# Patient Record
Sex: MALE | Race: WHITE | NOT HISPANIC OR LATINO | URBAN - METROPOLITAN AREA
[De-identification: names, ages, dates, MRNs, and addresses within clinical notes are randomized per-mention and may not be internally consistent; named-entity substitution may affect disease eponyms.]

---

## 2019-09-17 ENCOUNTER — APPOINTMENT (RX ONLY)
Dept: URBAN - METROPOLITAN AREA CLINIC 174 | Facility: CLINIC | Age: 69
Setting detail: DERMATOLOGY
End: 2019-09-17

## 2019-09-17 DIAGNOSIS — L81.4 OTHER MELANIN HYPERPIGMENTATION: ICD-10-CM

## 2019-09-17 DIAGNOSIS — Z71.89 OTHER SPECIFIED COUNSELING: ICD-10-CM

## 2019-09-17 DIAGNOSIS — D22 MELANOCYTIC NEVI: ICD-10-CM

## 2019-09-17 DIAGNOSIS — D18.0 HEMANGIOMA: ICD-10-CM

## 2019-09-17 DIAGNOSIS — L82.1 OTHER SEBORRHEIC KERATOSIS: ICD-10-CM

## 2019-09-17 DIAGNOSIS — L57.0 ACTINIC KERATOSIS: ICD-10-CM

## 2019-09-17 PROBLEM — D18.01 HEMANGIOMA OF SKIN AND SUBCUTANEOUS TISSUE: Status: ACTIVE | Noted: 2019-09-17

## 2019-09-17 PROBLEM — D22.5 MELANOCYTIC NEVI OF TRUNK: Status: ACTIVE | Noted: 2019-09-17

## 2019-09-17 PROBLEM — D48.5 NEOPLASM OF UNCERTAIN BEHAVIOR OF SKIN: Status: ACTIVE | Noted: 2019-09-17

## 2019-09-17 PROCEDURE — 17003 DESTRUCT PREMALG LES 2-14: CPT

## 2019-09-17 PROCEDURE — 17000 DESTRUCT PREMALG LESION: CPT | Mod: 59

## 2019-09-17 PROCEDURE — ? LIQUID NITROGEN

## 2019-09-17 PROCEDURE — 99214 OFFICE O/P EST MOD 30 MIN: CPT | Mod: 25

## 2019-09-17 PROCEDURE — 11102 TANGNTL BX SKIN SINGLE LES: CPT

## 2019-09-17 PROCEDURE — ? INVENTORY

## 2019-09-17 PROCEDURE — ? BIOPSY BY SHAVE METHOD

## 2019-09-17 PROCEDURE — ? COUNSELING

## 2019-09-17 ASSESSMENT — LOCATION ZONE DERM
LOCATION ZONE: EAR
LOCATION ZONE: NECK
LOCATION ZONE: FACE
LOCATION ZONE: TRUNK
LOCATION ZONE: LEG

## 2019-09-17 ASSESSMENT — LOCATION SIMPLE DESCRIPTION DERM
LOCATION SIMPLE: LEFT FOREHEAD
LOCATION SIMPLE: RIGHT LOWER BACK
LOCATION SIMPLE: LEFT CHEEK
LOCATION SIMPLE: LEFT POSTERIOR THIGH
LOCATION SIMPLE: RIGHT EAR
LOCATION SIMPLE: RIGHT ANTERIOR NECK
LOCATION SIMPLE: RIGHT UPPER BACK
LOCATION SIMPLE: ABDOMEN
LOCATION SIMPLE: RIGHT FOREHEAD
LOCATION SIMPLE: CHEST

## 2019-09-17 ASSESSMENT — LOCATION DETAILED DESCRIPTION DERM
LOCATION DETAILED: RIGHT MID-UPPER BACK
LOCATION DETAILED: LEFT FOREHEAD
LOCATION DETAILED: RIGHT TRIANGULAR FOSSA
LOCATION DETAILED: EPIGASTRIC SKIN
LOCATION DETAILED: RIGHT MEDIAL SUPERIOR CHEST
LOCATION DETAILED: LEFT SUPERIOR PREAURICULAR CHEEK
LOCATION DETAILED: STERNAL NOTCH
LOCATION DETAILED: LEFT SUPERIOR FOREHEAD
LOCATION DETAILED: LEFT MID PREAURICULAR CHEEK
LOCATION DETAILED: LEFT MEDIAL SUPERIOR CHEST
LOCATION DETAILED: RIGHT LATERAL FOREHEAD
LOCATION DETAILED: LEFT DISTAL POSTERIOR THIGH
LOCATION DETAILED: RIGHT SUPERIOR LATERAL MIDBACK
LOCATION DETAILED: RIGHT CLAVICULAR NECK

## 2019-09-17 NOTE — PROCEDURE: LIQUID NITROGEN
Post-Care Instructions: I reviewed with the patient in detail post-care instructions. Patient is to wear sunprotection, and avoid picking at any of the treated lesions. Pt may apply Vaseline to crusted or scabbing areas.
Render Post-Care Instructions In Note?: yes
Detail Level: Simple
Consent: The patient's consent was obtained including but not limited to risks of crusting, scabbing, blistering, scarring, darker or lighter pigmentary change, recurrence, incomplete removal and infection.
Duration Of Freeze Thaw-Cycle (Seconds): 5
Render Note In Bullet Format When Appropriate: No
Number Of Freeze-Thaw Cycles: 1 freeze-thaw cycle

## 2019-09-17 NOTE — PROCEDURE: MIPS QUALITY
Quality 402: Tobacco Use And Help With Quitting Among Adolescents: Patient screened for tobacco and never smoked
Quality 110: Preventive Care And Screening: Influenza Immunization: Influenza Immunization previously received during influenza season
Quality 431: Preventive Care And Screening: Unhealthy Alcohol Use - Screening: Patient screened for unhealthy alcohol use using a single question and scores less than 2 times per year
Detail Level: Detailed
Quality 111:Pneumonia Vaccination Status For Older Adults: Pneumococcal Vaccination Previously Received
Quality 226: Preventive Care And Screening: Tobacco Use: Screening And Cessation Intervention: Patient screened for tobacco use and is an ex/non-smoker
Quality 130: Documentation Of Current Medications In The Medical Record: Current Medications Documented

## 2019-09-17 NOTE — PROCEDURE: BIOPSY BY SHAVE METHOD
Render In Bullet Format When Appropriate: No
Hemostasis: Aluminum Chloride
Biopsy Method: Personna blade
Silver Nitrate Text: The wound bed was treated with silver nitrate after the biopsy was performed.
Was A Bandage Applied: Yes
Dressing: bandage
Detail Level: Detailed
Lab: 6
Billing Type: Third-Party Bill
Curettage Text: The wound bed was treated with curettage after the biopsy was performed.
Electrodesiccation Text: The wound bed was treated with electrodesiccation after the biopsy was performed.
Notification Instructions: Patient will be notified of biopsy results. However, patient instructed to call the office if not contacted within 2 weeks.
Depth Of Biopsy: dermis
Wound Care: Petrolatum
Size Of Lesion In Cm: 0
Anesthesia Type: 1% lidocaine with epinephrine
Cryotherapy Text: The wound bed was treated with cryotherapy after the biopsy was performed.
Post-Care Instructions: I reviewed with the patient in detail post-care instructions. Patient is to keep the biopsy site covered overnight, and then apply Vaseline twice daily until healed. Patient to wash with gentle soap and water twice a day.
Lab Facility: 3
Anesthesia Volume In Cc (Will Not Render If 0): 0.5
Type Of Destruction Used: Curettage
Consent: Written consent was obtained and risks were reviewed including but not limited to scarring, infection, bleeding, scabbing, incomplete removal, nerve damage and allergy to anesthesia.
Electrodesiccation And Curettage Text: The wound bed was treated with electrodesiccation and curettage after the biopsy was performed.
Biopsy Type: H and E

## 2019-11-05 ENCOUNTER — APPOINTMENT (RX ONLY)
Dept: URBAN - METROPOLITAN AREA CLINIC 174 | Facility: CLINIC | Age: 69
Setting detail: DERMATOLOGY
End: 2019-11-05

## 2019-11-05 DIAGNOSIS — L85.3 XEROSIS CUTIS: ICD-10-CM

## 2019-11-05 DIAGNOSIS — Z87.2 PERSONAL HISTORY OF DISEASES OF THE SKIN AND SUBCUTANEOUS TISSUE: ICD-10-CM

## 2019-11-05 DIAGNOSIS — L57.0 ACTINIC KERATOSIS: ICD-10-CM

## 2019-11-05 PROCEDURE — ? LIQUID NITROGEN

## 2019-11-05 PROCEDURE — 17000 DESTRUCT PREMALG LESION: CPT

## 2019-11-05 PROCEDURE — 99212 OFFICE O/P EST SF 10 MIN: CPT | Mod: 25

## 2019-11-05 PROCEDURE — ? PRESCRIPTION SAMPLES PROVIDED

## 2019-11-05 PROCEDURE — ? COUNSELING

## 2019-11-05 PROCEDURE — ? ADDITIONAL NOTES

## 2019-11-05 ASSESSMENT — LOCATION DETAILED DESCRIPTION DERM
LOCATION DETAILED: RIGHT CLAVICULAR NECK
LOCATION DETAILED: LEFT FOREHEAD
LOCATION DETAILED: RIGHT CENTRAL MALAR CHEEK
LOCATION DETAILED: LEFT CENTRAL MALAR CHEEK
LOCATION DETAILED: LEFT DISTAL POSTERIOR THIGH
LOCATION DETAILED: RIGHT FOREHEAD
LOCATION DETAILED: LEFT CLAVICULAR NECK
LOCATION DETAILED: LEFT INFERIOR ANTERIOR NECK

## 2019-11-05 ASSESSMENT — LOCATION SIMPLE DESCRIPTION DERM
LOCATION SIMPLE: LEFT CHEEK
LOCATION SIMPLE: LEFT ANTERIOR NECK
LOCATION SIMPLE: LEFT POSTERIOR THIGH
LOCATION SIMPLE: LEFT FOREHEAD
LOCATION SIMPLE: RIGHT CHEEK
LOCATION SIMPLE: RIGHT ANTERIOR NECK
LOCATION SIMPLE: RIGHT FOREHEAD

## 2019-11-05 ASSESSMENT — LOCATION ZONE DERM
LOCATION ZONE: FACE
LOCATION ZONE: NECK
LOCATION ZONE: LEG

## 2019-11-05 NOTE — PROCEDURE: ADDITIONAL NOTES
Additional Notes: Eucerin, Aveeno,  Serave CREAMS. Moisturizer after shower while slightly damp
Detail Level: Simple

## 2019-11-05 NOTE — PROCEDURE: LIQUID NITROGEN
Detail Level: Simple
Post-Care Instructions: I reviewed with the patient in detail post-care instructions. Patient is to wear sunprotection, and avoid picking at any of the treated lesions. Pt may apply Vaseline to crusted or scabbing areas.
Duration Of Freeze Thaw-Cycle (Seconds): 5
Render Note In Bullet Format When Appropriate: No
Render Post-Care Instructions In Note?: yes
Number Of Freeze-Thaw Cycles: 1 freeze-thaw cycle
Consent: The patient's consent was obtained including but not limited to risks of crusting, scabbing, blistering, scarring, darker or lighter pigmentary change, recurrence, incomplete removal and infection.

## 2019-11-05 NOTE — PROCEDURE: PRESCRIPTION SAMPLES PROVIDED
Samples Given: Eucerin and Cerave cream. Recommend creams over lotions to increase hydration and moisture.
Detail Level: Zone

## 2020-09-09 ENCOUNTER — APPOINTMENT (RX ONLY)
Dept: URBAN - METROPOLITAN AREA CLINIC 377 | Facility: CLINIC | Age: 70
Setting detail: DERMATOLOGY
End: 2020-09-09

## 2020-09-09 DIAGNOSIS — L57.0 ACTINIC KERATOSIS: ICD-10-CM

## 2020-09-09 DIAGNOSIS — D22 MELANOCYTIC NEVI: ICD-10-CM

## 2020-09-09 DIAGNOSIS — L82.1 OTHER SEBORRHEIC KERATOSIS: ICD-10-CM

## 2020-09-09 DIAGNOSIS — L81.4 OTHER MELANIN HYPERPIGMENTATION: ICD-10-CM

## 2020-09-09 DIAGNOSIS — D18.0 HEMANGIOMA: ICD-10-CM

## 2020-09-09 DIAGNOSIS — Z71.89 OTHER SPECIFIED COUNSELING: ICD-10-CM

## 2020-09-09 PROBLEM — D22.5 MELANOCYTIC NEVI OF TRUNK: Status: ACTIVE | Noted: 2020-09-09

## 2020-09-09 PROBLEM — D18.01 HEMANGIOMA OF SKIN AND SUBCUTANEOUS TISSUE: Status: ACTIVE | Noted: 2020-09-09

## 2020-09-09 PROBLEM — D48.5 NEOPLASM OF UNCERTAIN BEHAVIOR OF SKIN: Status: ACTIVE | Noted: 2020-09-09

## 2020-09-09 PROCEDURE — 17000 DESTRUCT PREMALG LESION: CPT | Mod: 59

## 2020-09-09 PROCEDURE — ? BIOPSY BY SHAVE METHOD

## 2020-09-09 PROCEDURE — 99214 OFFICE O/P EST MOD 30 MIN: CPT | Mod: 25

## 2020-09-09 PROCEDURE — 17003 DESTRUCT PREMALG LES 2-14: CPT

## 2020-09-09 PROCEDURE — ? LIQUID NITROGEN

## 2020-09-09 PROCEDURE — 11102 TANGNTL BX SKIN SINGLE LES: CPT

## 2020-09-09 PROCEDURE — ? ADDITIONAL NOTES

## 2020-09-09 PROCEDURE — ? COUNSELING

## 2020-09-09 PROCEDURE — ? FULL BODY SKIN EXAM

## 2020-09-09 ASSESSMENT — LOCATION SIMPLE DESCRIPTION DERM
LOCATION SIMPLE: LEFT EYEBROW
LOCATION SIMPLE: ABDOMEN
LOCATION SIMPLE: LEFT FOREARM
LOCATION SIMPLE: RIGHT LOWER BACK
LOCATION SIMPLE: CHEST
LOCATION SIMPLE: LEFT HAND
LOCATION SIMPLE: RIGHT UPPER BACK
LOCATION SIMPLE: LEFT CHEEK
LOCATION SIMPLE: LEFT FOREHEAD

## 2020-09-09 ASSESSMENT — LOCATION DETAILED DESCRIPTION DERM
LOCATION DETAILED: RIGHT MEDIAL SUPERIOR CHEST
LOCATION DETAILED: LEFT DORSAL INDEX METACARPOPHALANGEAL JOINT
LOCATION DETAILED: LEFT FOREHEAD
LOCATION DETAILED: LEFT INFERIOR CENTRAL MALAR CHEEK
LOCATION DETAILED: RIGHT SUPERIOR UPPER BACK
LOCATION DETAILED: LEFT LATERAL EYEBROW
LOCATION DETAILED: RIGHT MID-UPPER BACK
LOCATION DETAILED: RIGHT SUPERIOR LATERAL MIDBACK
LOCATION DETAILED: LEFT PROXIMAL DORSAL FOREARM
LOCATION DETAILED: EPIGASTRIC SKIN

## 2020-09-09 ASSESSMENT — LOCATION ZONE DERM
LOCATION ZONE: ARM
LOCATION ZONE: TRUNK
LOCATION ZONE: HAND
LOCATION ZONE: FACE

## 2020-09-09 NOTE — HPI: EVALUATION OF SKIN LESION(S)
What Type Of Note Output Would You Prefer (Optional)?: Bullet Format
Hpi Title: Evaluation of Skin Lesions
How Severe Are Your Spot(S)?: mild
Have Your Spot(S) Been Treated In The Past?: has not been treated
Additional History: FBSE
Hpi Title: Evaluation of a Skin Lesion
Have Your Spot(S) Been Treated In The Past?: has been treated
When Was It Treated?: 05/15/2020

## 2020-09-09 NOTE — PROCEDURE: LIQUID NITROGEN
Post-Care Instructions: I reviewed with the patient in detail post-care instructions. Patient is to wear sunprotection, and avoid picking at any of the treated lesions. Pt may apply Vaseline to crusted or scabbing areas.
Render Note In Bullet Format When Appropriate: No
Duration Of Freeze Thaw-Cycle (Seconds): 5
Detail Level: Simple
Consent: The patient's consent was obtained including but not limited to risks of crusting, scabbing, blistering, scarring, darker or lighter pigmentary change, recurrence, incomplete removal and infection.
Render Post-Care Instructions In Note?: yes
Number Of Freeze-Thaw Cycles: 1 freeze-thaw cycle

## 2021-06-21 ENCOUNTER — APPOINTMENT (RX ONLY)
Dept: URBAN - METROPOLITAN AREA CLINIC 174 | Facility: CLINIC | Age: 71
Setting detail: DERMATOLOGY
End: 2021-06-21

## 2021-06-21 DIAGNOSIS — L82.0 INFLAMED SEBORRHEIC KERATOSIS: ICD-10-CM

## 2021-06-21 DIAGNOSIS — L30.9 DERMATITIS, UNSPECIFIED: ICD-10-CM | Status: INADEQUATELY CONTROLLED

## 2021-06-21 PROBLEM — D48.5 NEOPLASM OF UNCERTAIN BEHAVIOR OF SKIN: Status: ACTIVE | Noted: 2021-06-21

## 2021-06-21 PROCEDURE — ? BIOPSY BY SHAVE METHOD

## 2021-06-21 PROCEDURE — ? COUNSELING

## 2021-06-21 PROCEDURE — 11102 TANGNTL BX SKIN SINGLE LES: CPT

## 2021-06-21 PROCEDURE — 99214 OFFICE O/P EST MOD 30 MIN: CPT | Mod: 25

## 2021-06-21 PROCEDURE — ? PRESCRIPTION

## 2021-06-21 PROCEDURE — ? TREATMENT REGIMEN

## 2021-06-21 PROCEDURE — ? MEDICATION COUNSELING

## 2021-06-21 RX ORDER — TRIAMCINOLONE ACETONIDE 1 MG/G
OINTMENT TOPICAL BID
Qty: 1 | Refills: 2 | Status: ERX

## 2021-06-21 ASSESSMENT — LOCATION SIMPLE DESCRIPTION DERM
LOCATION SIMPLE: RIGHT ANTERIOR NECK
LOCATION SIMPLE: RIGHT PRETIBIAL REGION

## 2021-06-21 ASSESSMENT — LOCATION DETAILED DESCRIPTION DERM
LOCATION DETAILED: RIGHT CLAVICULAR NECK
LOCATION DETAILED: RIGHT DISTAL PRETIBIAL REGION

## 2021-06-21 ASSESSMENT — LOCATION ZONE DERM
LOCATION ZONE: LEG
LOCATION ZONE: NECK

## 2021-06-21 NOTE — PROCEDURE: MEDICATION COUNSELING
Xelraadz Pregnancy And Lactation Text: This medication is Pregnancy Category D and is not considered safe during pregnancy.  The risk during breast feeding is also uncertain.

## 2021-10-18 ENCOUNTER — APPOINTMENT (RX ONLY)
Dept: URBAN - METROPOLITAN AREA CLINIC 174 | Facility: CLINIC | Age: 71
Setting detail: DERMATOLOGY
End: 2021-10-18

## 2021-10-18 DIAGNOSIS — D22 MELANOCYTIC NEVI: ICD-10-CM | Status: STABLE

## 2021-10-18 DIAGNOSIS — L57.0 ACTINIC KERATOSIS: ICD-10-CM

## 2021-10-18 DIAGNOSIS — D18.0 HEMANGIOMA: ICD-10-CM | Status: STABLE

## 2021-10-18 DIAGNOSIS — L81.4 OTHER MELANIN HYPERPIGMENTATION: ICD-10-CM | Status: STABLE

## 2021-10-18 DIAGNOSIS — L82.1 OTHER SEBORRHEIC KERATOSIS: ICD-10-CM | Status: STABLE

## 2021-10-18 DIAGNOSIS — Z71.89 OTHER SPECIFIED COUNSELING: ICD-10-CM

## 2021-10-18 PROBLEM — D22.5 MELANOCYTIC NEVI OF TRUNK: Status: ACTIVE | Noted: 2021-10-18

## 2021-10-18 PROBLEM — D18.01 HEMANGIOMA OF SKIN AND SUBCUTANEOUS TISSUE: Status: ACTIVE | Noted: 2021-10-18

## 2021-10-18 PROCEDURE — ? FULL BODY SKIN EXAM

## 2021-10-18 PROCEDURE — 99213 OFFICE O/P EST LOW 20 MIN: CPT | Mod: 25

## 2021-10-18 PROCEDURE — 17003 DESTRUCT PREMALG LES 2-14: CPT

## 2021-10-18 PROCEDURE — ? LIQUID NITROGEN

## 2021-10-18 PROCEDURE — ? ADDITIONAL NOTES

## 2021-10-18 PROCEDURE — ? COUNSELING

## 2021-10-18 PROCEDURE — 17000 DESTRUCT PREMALG LESION: CPT

## 2021-10-18 PROCEDURE — ? SUNSCREEN TREATMENT REGIMEN

## 2021-10-18 ASSESSMENT — LOCATION SIMPLE DESCRIPTION DERM
LOCATION SIMPLE: UPPER BACK
LOCATION SIMPLE: RIGHT ANTERIOR NECK
LOCATION SIMPLE: CHEST
LOCATION SIMPLE: LEFT ANTERIOR NECK
LOCATION SIMPLE: LEFT UPPER BACK
LOCATION SIMPLE: LEFT FOREARM
LOCATION SIMPLE: LEFT FOREHEAD

## 2021-10-18 ASSESSMENT — LOCATION DETAILED DESCRIPTION DERM
LOCATION DETAILED: LEFT SUPERIOR MEDIAL UPPER BACK
LOCATION DETAILED: SUPERIOR THORACIC SPINE
LOCATION DETAILED: RIGHT CLAVICULAR NECK
LOCATION DETAILED: LEFT SUPERIOR UPPER BACK
LOCATION DETAILED: LEFT MEDIAL UPPER BACK
LOCATION DETAILED: LEFT INFERIOR LATERAL FOREHEAD
LOCATION DETAILED: LEFT DISTAL DORSAL FOREARM
LOCATION DETAILED: STERNUM
LOCATION DETAILED: LEFT INFERIOR ANTERIOR NECK
LOCATION DETAILED: RIGHT MEDIAL SUPERIOR CHEST

## 2021-10-18 ASSESSMENT — LOCATION ZONE DERM
LOCATION ZONE: FACE
LOCATION ZONE: ARM
LOCATION ZONE: TRUNK
LOCATION ZONE: NECK

## 2021-10-18 NOTE — PROCEDURE: LIQUID NITROGEN
Duration Of Freeze Thaw-Cycle (Seconds): 6
Render Note In Bullet Format When Appropriate: No
Show Aperture Variable?: Yes
Detail Level: Detailed
Number Of Freeze-Thaw Cycles: 1 freeze-thaw cycle
Consent: The patient's consent was obtained including but not limited to risks of crusting, scabbing, blistering, scarring, darker or lighter pigmentary change, recurrence, incomplete removal and infection.
Post-Care Instructions: I reviewed with the patient in detail post-care instructions. Patient is to wear sunprotection, and avoid picking at any of the treated lesions. Pt may apply Vaseline to crusted or scabbing areas.

## 2022-04-25 NOTE — PROCEDURE: MEDICATION COUNSELING
Provider Procedure Text (A): After obtaining clear surgical margins the defect was repaired by another provider. Libtayo Pregnancy And Lactation Text: This medication is contraindicated in pregnancy and when breast feeding.

## 2022-08-29 ENCOUNTER — APPOINTMENT (RX ONLY)
Dept: URBAN - METROPOLITAN AREA CLINIC 174 | Facility: CLINIC | Age: 72
Setting detail: DERMATOLOGY
End: 2022-08-29

## 2022-08-29 DIAGNOSIS — L57.0 ACTINIC KERATOSIS: ICD-10-CM

## 2022-08-29 DIAGNOSIS — L85.3 XEROSIS CUTIS: ICD-10-CM | Status: WELL CONTROLLED

## 2022-08-29 PROCEDURE — 99213 OFFICE O/P EST LOW 20 MIN: CPT | Mod: 25

## 2022-08-29 PROCEDURE — 17003 DESTRUCT PREMALG LES 2-14: CPT

## 2022-08-29 PROCEDURE — 17000 DESTRUCT PREMALG LESION: CPT

## 2022-08-29 PROCEDURE — ? LIQUID NITROGEN

## 2022-08-29 PROCEDURE — ? PRESCRIPTION

## 2022-08-29 PROCEDURE — ? ADDITIONAL NOTES

## 2022-08-29 PROCEDURE — ? COUNSELING

## 2022-08-29 RX ORDER — AMMONIUM LACTATE 12 G/100G
LOTION TOPICAL BID
Qty: 400 | Refills: 5 | Status: ERX | COMMUNITY
Start: 2022-08-29

## 2022-08-29 RX ADMIN — AMMONIUM LACTATE: 12 LOTION TOPICAL at 00:00

## 2022-08-29 ASSESSMENT — LOCATION SIMPLE DESCRIPTION DERM
LOCATION SIMPLE: RIGHT THIGH
LOCATION SIMPLE: RIGHT UPPER ARM
LOCATION SIMPLE: RIGHT FOREHEAD
LOCATION SIMPLE: LEFT UPPER ARM
LOCATION SIMPLE: LEFT THIGH

## 2022-08-29 ASSESSMENT — LOCATION DETAILED DESCRIPTION DERM
LOCATION DETAILED: RIGHT LATERAL FOREHEAD
LOCATION DETAILED: RIGHT SUPERIOR FOREHEAD
LOCATION DETAILED: LEFT ANTERIOR DISTAL UPPER ARM
LOCATION DETAILED: RIGHT ANTERIOR DISTAL UPPER ARM
LOCATION DETAILED: RIGHT ANTERIOR PROXIMAL THIGH
LOCATION DETAILED: LEFT ANTERIOR PROXIMAL THIGH

## 2022-08-29 ASSESSMENT — LOCATION ZONE DERM
LOCATION ZONE: LEG
LOCATION ZONE: FACE
LOCATION ZONE: ARM

## 2022-08-29 NOTE — PROCEDURE: LIQUID NITROGEN
Render Note In Bullet Format When Appropriate: No
Post-Care Instructions: I reviewed with the patient in detail post-care instructions. Patient is to wear sunprotection, and avoid picking at any of the treated lesions. Pt may apply Vaseline to crusted or scabbing areas.
Duration Of Freeze Thaw-Cycle (Seconds): 6
Show Applicator Variable?: Yes
Number Of Freeze-Thaw Cycles: 1 freeze-thaw cycle
Consent: The patient's consent was obtained including but not limited to risks of crusting, scabbing, blistering, scarring, darker or lighter pigmentary change, recurrence, incomplete removal and infection.
Detail Level: Detailed

## 2022-08-29 NOTE — PROCEDURE: ADDITIONAL NOTES
Detail Level: Detailed
Additional Notes: Scalp with scattered red macs — mild and fine scale
Render Risk Assessment In Note?: no

## 2023-09-27 ENCOUNTER — OFFICE (OUTPATIENT)
Dept: URBAN - METROPOLITAN AREA PATHOLOGY 1 | Facility: PATHOLOGY | Age: 73
End: 2023-09-27
Payer: COMMERCIAL

## 2023-09-27 ENCOUNTER — AMBULATORY SURGICAL CENTER (OUTPATIENT)
Dept: URBAN - METROPOLITAN AREA SURGERY 5 | Facility: SURGERY | Age: 73
End: 2023-09-27
Payer: COMMERCIAL

## 2023-09-27 ENCOUNTER — AMBULATORY SURGICAL CENTER (OUTPATIENT)
Dept: URBAN - METROPOLITAN AREA SURGERY 5 | Facility: SURGERY | Age: 73
End: 2023-09-27

## 2023-09-27 VITALS
HEIGHT: 73 IN | DIASTOLIC BLOOD PRESSURE: 63 MMHG | TEMPERATURE: 97.5 F | SYSTOLIC BLOOD PRESSURE: 122 MMHG | SYSTOLIC BLOOD PRESSURE: 100 MMHG | SYSTOLIC BLOOD PRESSURE: 101 MMHG | TEMPERATURE: 97.5 F | OXYGEN SATURATION: 95 % | HEART RATE: 70 BPM | SYSTOLIC BLOOD PRESSURE: 122 MMHG | DIASTOLIC BLOOD PRESSURE: 68 MMHG | RESPIRATION RATE: 20 BRPM | RESPIRATION RATE: 18 BRPM | RESPIRATION RATE: 21 BRPM | SYSTOLIC BLOOD PRESSURE: 110 MMHG | RESPIRATION RATE: 18 BRPM | HEART RATE: 67 BPM | HEART RATE: 78 BPM | DIASTOLIC BLOOD PRESSURE: 63 MMHG | WEIGHT: 225 LBS | OXYGEN SATURATION: 95 % | SYSTOLIC BLOOD PRESSURE: 114 MMHG | HEART RATE: 77 BPM | OXYGEN SATURATION: 98 % | OXYGEN SATURATION: 96 % | SYSTOLIC BLOOD PRESSURE: 120 MMHG | OXYGEN SATURATION: 97 % | RESPIRATION RATE: 17 BRPM | SYSTOLIC BLOOD PRESSURE: 114 MMHG | SYSTOLIC BLOOD PRESSURE: 117 MMHG | SYSTOLIC BLOOD PRESSURE: 101 MMHG | DIASTOLIC BLOOD PRESSURE: 73 MMHG | DIASTOLIC BLOOD PRESSURE: 73 MMHG | HEIGHT: 73 IN | RESPIRATION RATE: 13 BRPM | DIASTOLIC BLOOD PRESSURE: 76 MMHG | RESPIRATION RATE: 17 BRPM | SYSTOLIC BLOOD PRESSURE: 117 MMHG | SYSTOLIC BLOOD PRESSURE: 110 MMHG | HEART RATE: 75 BPM | HEART RATE: 77 BPM | DIASTOLIC BLOOD PRESSURE: 68 MMHG | DIASTOLIC BLOOD PRESSURE: 67 MMHG | RESPIRATION RATE: 13 BRPM | DIASTOLIC BLOOD PRESSURE: 65 MMHG | DIASTOLIC BLOOD PRESSURE: 79 MMHG | SYSTOLIC BLOOD PRESSURE: 100 MMHG | HEART RATE: 76 BPM | DIASTOLIC BLOOD PRESSURE: 65 MMHG | OXYGEN SATURATION: 98 % | HEART RATE: 74 BPM | OXYGEN SATURATION: 96 % | DIASTOLIC BLOOD PRESSURE: 67 MMHG | DIASTOLIC BLOOD PRESSURE: 79 MMHG | HEART RATE: 75 BPM | SYSTOLIC BLOOD PRESSURE: 133 MMHG | HEART RATE: 70 BPM | HEART RATE: 74 BPM | SYSTOLIC BLOOD PRESSURE: 99 MMHG | SYSTOLIC BLOOD PRESSURE: 115 MMHG | OXYGEN SATURATION: 97 % | RESPIRATION RATE: 21 BRPM | HEART RATE: 76 BPM | SYSTOLIC BLOOD PRESSURE: 99 MMHG | DIASTOLIC BLOOD PRESSURE: 76 MMHG | RESPIRATION RATE: 20 BRPM | SYSTOLIC BLOOD PRESSURE: 120 MMHG | HEART RATE: 67 BPM | HEART RATE: 78 BPM | WEIGHT: 225 LBS | SYSTOLIC BLOOD PRESSURE: 115 MMHG | SYSTOLIC BLOOD PRESSURE: 133 MMHG

## 2023-09-27 DIAGNOSIS — K64.0 FIRST DEGREE HEMORRHOIDS: ICD-10-CM

## 2023-09-27 DIAGNOSIS — K62.1 RECTAL POLYP: ICD-10-CM

## 2023-09-27 DIAGNOSIS — Z86.010 PERSONAL HISTORY OF COLONIC POLYPS: ICD-10-CM

## 2023-09-27 DIAGNOSIS — K63.5 POLYP OF COLON: ICD-10-CM

## 2023-09-27 DIAGNOSIS — Z09 ENCOUNTER FOR FOLLOW-UP EXAMINATION AFTER COMPLETED TREATMEN: ICD-10-CM

## 2023-09-27 DIAGNOSIS — K57.30 DIVERTICULOSIS OF LARGE INTESTINE WITHOUT PERFORATION OR ABS: ICD-10-CM

## 2023-09-27 PROCEDURE — 45385 COLONOSCOPY W/LESION REMOVAL: CPT | Mod: PT | Performed by: INTERNAL MEDICINE

## 2023-09-27 PROCEDURE — 88305 TISSUE EXAM BY PATHOLOGIST: CPT | Performed by: PATHOLOGY

## 2023-10-02 LAB
PDF: PDF REPORT: (no result)
PDF: PDF REPORT: (no result)

## 2024-05-10 ENCOUNTER — APPOINTMENT (OUTPATIENT)
Dept: GENERAL RADIOLOGY | Facility: HOSPITAL | Age: 74
End: 2024-05-10
Payer: MEDICARE

## 2024-05-10 ENCOUNTER — APPOINTMENT (OUTPATIENT)
Dept: CT IMAGING | Facility: HOSPITAL | Age: 74
End: 2024-05-10
Payer: MEDICARE

## 2024-05-10 ENCOUNTER — HOSPITAL ENCOUNTER (OUTPATIENT)
Facility: HOSPITAL | Age: 74
Setting detail: OBSERVATION
Discharge: HOME | End: 2024-05-12
Attending: EMERGENCY MEDICINE | Admitting: EMERGENCY MEDICINE
Payer: MEDICARE

## 2024-05-10 DIAGNOSIS — R10.13 EPIGASTRIC PAIN: Primary | ICD-10-CM

## 2024-05-10 DIAGNOSIS — I45.10 RIGHT BUNDLE BRANCH BLOCK: ICD-10-CM

## 2024-05-10 DIAGNOSIS — N28.9 RENAL INSUFFICIENCY: ICD-10-CM

## 2024-05-10 LAB
ALBUMIN SERPL-MCNC: 4.1 G/DL (ref 3.5–5.2)
ALBUMIN/GLOB SERPL: 1.6 G/DL
ALP SERPL-CCNC: 83 U/L (ref 39–117)
ALT SERPL W P-5'-P-CCNC: 22 U/L (ref 1–41)
ANION GAP SERPL CALCULATED.3IONS-SCNC: 15 MMOL/L (ref 5–15)
AST SERPL-CCNC: 27 U/L (ref 1–40)
BASOPHILS # BLD AUTO: 0.12 10*3/MM3 (ref 0–0.2)
BASOPHILS NFR BLD AUTO: 1.4 % (ref 0–1.5)
BILIRUB SERPL-MCNC: 1 MG/DL (ref 0–1.2)
BILIRUB UR QL STRIP: NEGATIVE
BUN SERPL-MCNC: 24 MG/DL (ref 8–23)
BUN/CREAT SERPL: 15.6 (ref 7–25)
CALCIUM SPEC-SCNC: 8.6 MG/DL (ref 8.6–10.5)
CHLORIDE SERPL-SCNC: 104 MMOL/L (ref 98–107)
CLARITY UR: CLEAR
CO2 SERPL-SCNC: 22 MMOL/L (ref 22–29)
COLOR UR: YELLOW
CREAT SERPL-MCNC: 1.54 MG/DL (ref 0.76–1.27)
D DIMER PPP FEU-MCNC: <0.27 MCGFEU/ML (ref 0–0.74)
DEPRECATED RDW RBC AUTO: 41.9 FL (ref 37–54)
EGFRCR SERPLBLD CKD-EPI 2021: 47 ML/MIN/1.73
EOSINOPHIL # BLD AUTO: 0.26 10*3/MM3 (ref 0–0.4)
EOSINOPHIL NFR BLD AUTO: 3.1 % (ref 0.3–6.2)
ERYTHROCYTE [DISTWIDTH] IN BLOOD BY AUTOMATED COUNT: 13.1 % (ref 12.3–15.4)
GEN 5 2HR TROPONIN T REFLEX: 11 NG/L
GLOBULIN UR ELPH-MCNC: 2.5 GM/DL
GLUCOSE SERPL-MCNC: 102 MG/DL (ref 65–99)
GLUCOSE UR STRIP-MCNC: NEGATIVE MG/DL
HCT VFR BLD AUTO: 48.6 % (ref 37.5–51)
HGB BLD-MCNC: 16 G/DL (ref 13–17.7)
HGB UR QL STRIP.AUTO: NEGATIVE
HOLD SPECIMEN: NORMAL
HOLD SPECIMEN: NORMAL
IMM GRANULOCYTES # BLD AUTO: 0.02 10*3/MM3 (ref 0–0.05)
IMM GRANULOCYTES NFR BLD AUTO: 0.2 % (ref 0–0.5)
INR PPP: 1.1 (ref 0.9–1.1)
KETONES UR QL STRIP: NEGATIVE
LEUKOCYTE ESTERASE UR QL STRIP.AUTO: NEGATIVE
LIPASE SERPL-CCNC: 87 U/L (ref 13–60)
LYMPHOCYTES # BLD AUTO: 2.05 10*3/MM3 (ref 0.7–3.1)
LYMPHOCYTES NFR BLD AUTO: 24.5 % (ref 19.6–45.3)
MCH RBC QN AUTO: 28.7 PG (ref 26.6–33)
MCHC RBC AUTO-ENTMCNC: 32.9 G/DL (ref 31.5–35.7)
MCV RBC AUTO: 87.3 FL (ref 79–97)
MONOCYTES # BLD AUTO: 1.15 10*3/MM3 (ref 0.1–0.9)
MONOCYTES NFR BLD AUTO: 13.8 % (ref 5–12)
NEUTROPHILS NFR BLD AUTO: 4.76 10*3/MM3 (ref 1.7–7)
NEUTROPHILS NFR BLD AUTO: 57 % (ref 42.7–76)
NITRITE UR QL STRIP: NEGATIVE
NRBC BLD AUTO-RTO: 0 /100 WBC (ref 0–0.2)
PH UR STRIP.AUTO: 5.5 [PH] (ref 5–8)
PLATELET # BLD AUTO: 196 10*3/MM3 (ref 140–450)
PMV BLD AUTO: 9.8 FL (ref 6–12)
POTASSIUM SERPL-SCNC: 3.4 MMOL/L (ref 3.5–5.2)
PROT SERPL-MCNC: 6.6 G/DL (ref 6–8.5)
PROT UR QL STRIP: NEGATIVE
PROTHROMBIN TIME: 14.4 SECONDS (ref 11.7–14.2)
QT INTERVAL: 424 MS
QTC INTERVAL: 468 MS
RBC # BLD AUTO: 5.57 10*6/MM3 (ref 4.14–5.8)
SODIUM SERPL-SCNC: 141 MMOL/L (ref 136–145)
SP GR UR STRIP: >1.03 (ref 1–1.03)
TROPONIN T DELTA: 0 NG/L
TROPONIN T SERPL HS-MCNC: 11 NG/L
UROBILINOGEN UR QL STRIP: ABNORMAL
WBC NRBC COR # BLD AUTO: 8.36 10*3/MM3 (ref 3.4–10.8)
WHOLE BLOOD HOLD COAG: NORMAL
WHOLE BLOOD HOLD SPECIMEN: NORMAL

## 2024-05-10 PROCEDURE — 96361 HYDRATE IV INFUSION ADD-ON: CPT

## 2024-05-10 PROCEDURE — 96376 TX/PRO/DX INJ SAME DRUG ADON: CPT

## 2024-05-10 PROCEDURE — 85025 COMPLETE CBC W/AUTO DIFF WBC: CPT | Performed by: EMERGENCY MEDICINE

## 2024-05-10 PROCEDURE — 96374 THER/PROPH/DIAG INJ IV PUSH: CPT

## 2024-05-10 PROCEDURE — 99285 EMERGENCY DEPT VISIT HI MDM: CPT

## 2024-05-10 PROCEDURE — 93005 ELECTROCARDIOGRAM TRACING: CPT | Performed by: EMERGENCY MEDICINE

## 2024-05-10 PROCEDURE — G0378 HOSPITAL OBSERVATION PER HR: HCPCS

## 2024-05-10 PROCEDURE — 81003 URINALYSIS AUTO W/O SCOPE: CPT | Performed by: EMERGENCY MEDICINE

## 2024-05-10 PROCEDURE — 93010 ELECTROCARDIOGRAM REPORT: CPT | Performed by: INTERNAL MEDICINE

## 2024-05-10 PROCEDURE — 36415 COLL VENOUS BLD VENIPUNCTURE: CPT

## 2024-05-10 PROCEDURE — 25510000001 IOPAMIDOL PER 1 ML: Performed by: EMERGENCY MEDICINE

## 2024-05-10 PROCEDURE — 96375 TX/PRO/DX INJ NEW DRUG ADDON: CPT

## 2024-05-10 PROCEDURE — 80053 COMPREHEN METABOLIC PANEL: CPT | Performed by: EMERGENCY MEDICINE

## 2024-05-10 PROCEDURE — 25010000002 MORPHINE PER 10 MG: Performed by: EMERGENCY MEDICINE

## 2024-05-10 PROCEDURE — 25010000002 ONDANSETRON PER 1 MG: Performed by: EMERGENCY MEDICINE

## 2024-05-10 PROCEDURE — 71275 CT ANGIOGRAPHY CHEST: CPT

## 2024-05-10 PROCEDURE — 85610 PROTHROMBIN TIME: CPT | Performed by: EMERGENCY MEDICINE

## 2024-05-10 PROCEDURE — 71045 X-RAY EXAM CHEST 1 VIEW: CPT

## 2024-05-10 PROCEDURE — 85379 FIBRIN DEGRADATION QUANT: CPT | Performed by: EMERGENCY MEDICINE

## 2024-05-10 PROCEDURE — 63710000001 ONDANSETRON ODT 4 MG TABLET DISPERSIBLE: Performed by: EMERGENCY MEDICINE

## 2024-05-10 PROCEDURE — 93005 ELECTROCARDIOGRAM TRACING: CPT

## 2024-05-10 PROCEDURE — 74174 CTA ABD&PLVS W/CONTRAST: CPT

## 2024-05-10 PROCEDURE — 25810000003 SODIUM CHLORIDE 0.9 % SOLUTION: Performed by: EMERGENCY MEDICINE

## 2024-05-10 PROCEDURE — 84484 ASSAY OF TROPONIN QUANT: CPT | Performed by: EMERGENCY MEDICINE

## 2024-05-10 PROCEDURE — 83690 ASSAY OF LIPASE: CPT | Performed by: EMERGENCY MEDICINE

## 2024-05-10 RX ORDER — NITROGLYCERIN 0.4 MG/1
0.4 TABLET SUBLINGUAL
Status: DISCONTINUED | OUTPATIENT
Start: 2024-05-10 | End: 2024-05-12 | Stop reason: HOSPADM

## 2024-05-10 RX ORDER — ACETAMINOPHEN 325 MG/1
650 TABLET ORAL EVERY 4 HOURS PRN
Status: DISCONTINUED | OUTPATIENT
Start: 2024-05-10 | End: 2024-05-12 | Stop reason: HOSPADM

## 2024-05-10 RX ORDER — ASPIRIN 325 MG
325 TABLET ORAL ONCE
Status: DISCONTINUED | OUTPATIENT
Start: 2024-05-10 | End: 2024-05-12 | Stop reason: HOSPADM

## 2024-05-10 RX ORDER — ONDANSETRON 4 MG/1
4 TABLET, ORALLY DISINTEGRATING ORAL ONCE
Status: COMPLETED | OUTPATIENT
Start: 2024-05-10 | End: 2024-05-10

## 2024-05-10 RX ORDER — SODIUM CHLORIDE 0.9 % (FLUSH) 0.9 %
10 SYRINGE (ML) INJECTION EVERY 12 HOURS SCHEDULED
Status: DISCONTINUED | OUTPATIENT
Start: 2024-05-10 | End: 2024-05-12 | Stop reason: HOSPADM

## 2024-05-10 RX ORDER — SODIUM CHLORIDE 0.9 % (FLUSH) 0.9 %
10 SYRINGE (ML) INJECTION AS NEEDED
Status: DISCONTINUED | OUTPATIENT
Start: 2024-05-10 | End: 2024-05-12 | Stop reason: HOSPADM

## 2024-05-10 RX ORDER — SODIUM CHLORIDE 9 MG/ML
40 INJECTION, SOLUTION INTRAVENOUS AS NEEDED
Status: DISCONTINUED | OUTPATIENT
Start: 2024-05-10 | End: 2024-05-12 | Stop reason: HOSPADM

## 2024-05-10 RX ORDER — ONDANSETRON 2 MG/ML
4 INJECTION INTRAMUSCULAR; INTRAVENOUS ONCE
Status: COMPLETED | OUTPATIENT
Start: 2024-05-10 | End: 2024-05-10

## 2024-05-10 RX ORDER — MORPHINE SULFATE 2 MG/ML
4 INJECTION, SOLUTION INTRAMUSCULAR; INTRAVENOUS ONCE
Status: COMPLETED | OUTPATIENT
Start: 2024-05-10 | End: 2024-05-10

## 2024-05-10 RX ORDER — HYDROCHLOROTHIAZIDE 25 MG/1
25 TABLET ORAL
Status: DISCONTINUED | OUTPATIENT
Start: 2024-05-11 | End: 2024-05-12 | Stop reason: HOSPADM

## 2024-05-10 RX ORDER — ONDANSETRON 4 MG/1
4 TABLET, ORALLY DISINTEGRATING ORAL EVERY 6 HOURS PRN
Status: DISCONTINUED | OUTPATIENT
Start: 2024-05-10 | End: 2024-05-12 | Stop reason: HOSPADM

## 2024-05-10 RX ORDER — FAMOTIDINE 10 MG/ML
20 INJECTION, SOLUTION INTRAVENOUS ONCE
Status: COMPLETED | OUTPATIENT
Start: 2024-05-10 | End: 2024-05-10

## 2024-05-10 RX ORDER — ACETAMINOPHEN 500 MG
1000 TABLET ORAL EVERY 4 HOURS PRN
COMMUNITY

## 2024-05-10 RX ORDER — LISINOPRIL 20 MG/1
20 TABLET ORAL
Status: DISCONTINUED | OUTPATIENT
Start: 2024-05-11 | End: 2024-05-12 | Stop reason: HOSPADM

## 2024-05-10 RX ORDER — SODIUM CHLORIDE 9 MG/ML
125 INJECTION, SOLUTION INTRAVENOUS CONTINUOUS
Status: DISCONTINUED | OUTPATIENT
Start: 2024-05-10 | End: 2024-05-12 | Stop reason: HOSPADM

## 2024-05-10 RX ORDER — ONDANSETRON 2 MG/ML
4 INJECTION INTRAMUSCULAR; INTRAVENOUS EVERY 6 HOURS PRN
Status: DISCONTINUED | OUTPATIENT
Start: 2024-05-10 | End: 2024-05-12 | Stop reason: HOSPADM

## 2024-05-10 RX ADMIN — MORPHINE SULFATE 4 MG: 2 INJECTION, SOLUTION INTRAMUSCULAR; INTRAVENOUS at 21:40

## 2024-05-10 RX ADMIN — ONDANSETRON 4 MG: 4 TABLET, ORALLY DISINTEGRATING ORAL at 21:40

## 2024-05-10 RX ADMIN — SODIUM CHLORIDE 500 ML: 9 INJECTION, SOLUTION INTRAVENOUS at 18:52

## 2024-05-10 RX ADMIN — SODIUM CHLORIDE 125 ML/HR: 9 INJECTION, SOLUTION INTRAVENOUS at 21:40

## 2024-05-10 RX ADMIN — ONDANSETRON 4 MG: 2 INJECTION INTRAMUSCULAR; INTRAVENOUS at 18:19

## 2024-05-10 RX ADMIN — IOPAMIDOL 95 ML: 755 INJECTION, SOLUTION INTRAVENOUS at 19:13

## 2024-05-10 RX ADMIN — MORPHINE SULFATE 4 MG: 2 INJECTION, SOLUTION INTRAMUSCULAR; INTRAVENOUS at 18:19

## 2024-05-10 RX ADMIN — FAMOTIDINE 20 MG: 10 INJECTION INTRAVENOUS at 21:40

## 2024-05-10 RX ADMIN — Medication 10 ML: at 18:19

## 2024-05-10 NOTE — ED PROVIDER NOTES
EMERGENCY DEPARTMENT ENCOUNTER    Room Number:  16/16  Date seen:  5/10/2024  PCP: Provider, No Known  Historian: Patient      HPI:  Chief Complaint: Epigastric pain  Context: Chilango Ramirez is a 73 y.o. male who presents to the ED c/o acute epigastric pain into his back that began approximate 1 hour ago.  The patient states he does not have any history of heart disease.  He states he did have his gallbladder out about 10 years ago.  The patient is in town from [place] visiting family.  The patient states the pain reminds him of what he had before his gallbladder.  Patient denies shortness of breath, lower extremity pain or lower extremity swelling.  He states he has had nausea diaphoresis and radiation to his back.      PAST MEDICAL HISTORY  Active Ambulatory Problems     Diagnosis Date Noted    No Active Ambulatory Problems     Resolved Ambulatory Problems     Diagnosis Date Noted    No Resolved Ambulatory Problems     Past Medical History:   Diagnosis Date    Hypertension     Urine abnormality          REVIEW OF SYSTEMS  All systems reviewed and negative except for those discussed in HPI.       PAST SURGICAL HISTORY  History reviewed. No pertinent surgical history.      FAMILY HISTORY  History reviewed. No pertinent family history.      SOCIAL HISTORY  Social History     Socioeconomic History    Marital status:    Tobacco Use    Smoking status: Never    Smokeless tobacco: Never   Substance and Sexual Activity    Alcohol use: No         ALLERGIES  Patient has no known allergies.      PHYSICAL EXAM  ED Triage Vitals   Temp Heart Rate Resp BP SpO2   05/10/24 1744 05/10/24 1744 05/10/24 1744 05/10/24 1805 05/10/24 1744   96.3 °F (35.7 °C) 84 16 117/76 97 %      Temp src Heart Rate Source Patient Position BP Location FiO2 (%)   -- -- 05/10/24 1805 05/10/24 1805 --     Sitting Right arm        Physical Exam      GENERAL: 73-year-old male in mild distress  HENT: NCAT: nares patent: Neck supple  EYES: no scleral  icterus  CV: regular rhythm, normal rate  RESPIRATORY: normal effort  ABDOMEN reproducible epigastric tenderness with guarding but no rebound and diminished bowel sounds  MUSCULOSKELETAL: no deformity  NEURO: alert with nonfocal neuro exam  PSYCH:  calm, cooperative  SKIN: warm, dry    Vital signs and nursing notes reviewed.      LAB RESULTS  Recent Results (from the past 24 hour(s))   PSA DIAGNOSTIC    Collection Time: 05/10/24  9:21 AM    Specimen type and source: Blood., Venous.   Result Value Ref Range    PSA 1.52 <4.00 NG/ML   ECG 12 Lead ED Triage Standing Order; Chest Pain    Collection Time: 05/10/24  5:47 PM   Result Value Ref Range    QT Interval 424 ms    QTC Interval 468 ms   Comprehensive Metabolic Panel    Collection Time: 05/10/24  6:05 PM    Specimen: Arm, Left; Blood   Result Value Ref Range    Glucose 102 (H) 65 - 99 mg/dL    BUN 24 (H) 8 - 23 mg/dL    Creatinine 1.54 (H) 0.76 - 1.27 mg/dL    Sodium 141 136 - 145 mmol/L    Potassium 3.4 (L) 3.5 - 5.2 mmol/L    Chloride 104 98 - 107 mmol/L    CO2 22.0 22.0 - 29.0 mmol/L    Calcium 8.6 8.6 - 10.5 mg/dL    Total Protein 6.6 6.0 - 8.5 g/dL    Albumin 4.1 3.5 - 5.2 g/dL    ALT (SGPT) 22 1 - 41 U/L    AST (SGOT) 27 1 - 40 U/L    Alkaline Phosphatase 83 39 - 117 U/L    Total Bilirubin 1.0 0.0 - 1.2 mg/dL    Globulin 2.5 gm/dL    A/G Ratio 1.6 g/dL    BUN/Creatinine Ratio 15.6 7.0 - 25.0    Anion Gap 15.0 5.0 - 15.0 mmol/L    eGFR 47.0 (L) >60.0 mL/min/1.73   High Sensitivity Troponin T    Collection Time: 05/10/24  6:05 PM    Specimen: Arm, Left; Blood   Result Value Ref Range    HS Troponin T 11 <22 ng/L   Green Top (Gel)    Collection Time: 05/10/24  6:05 PM   Result Value Ref Range    Extra Tube Hold for add-ons.    Lavender Top    Collection Time: 05/10/24  6:05 PM   Result Value Ref Range    Extra Tube hold for add-on    Gold Top - SST    Collection Time: 05/10/24  6:05 PM   Result Value Ref Range    Extra Tube Hold for add-ons.    Light Blue Top     Collection Time: 05/10/24  6:05 PM   Result Value Ref Range    Extra Tube Hold for add-ons.    CBC Auto Differential    Collection Time: 05/10/24  6:05 PM    Specimen: Arm, Left; Blood   Result Value Ref Range    WBC 8.36 3.40 - 10.80 10*3/mm3    RBC 5.57 4.14 - 5.80 10*6/mm3    Hemoglobin 16.0 13.0 - 17.7 g/dL    Hematocrit 48.6 37.5 - 51.0 %    MCV 87.3 79.0 - 97.0 fL    MCH 28.7 26.6 - 33.0 pg    MCHC 32.9 31.5 - 35.7 g/dL    RDW 13.1 12.3 - 15.4 %    RDW-SD 41.9 37.0 - 54.0 fl    MPV 9.8 6.0 - 12.0 fL    Platelets 196 140 - 450 10*3/mm3    Neutrophil % 57.0 42.7 - 76.0 %    Lymphocyte % 24.5 19.6 - 45.3 %    Monocyte % 13.8 (H) 5.0 - 12.0 %    Eosinophil % 3.1 0.3 - 6.2 %    Basophil % 1.4 0.0 - 1.5 %    Immature Grans % 0.2 0.0 - 0.5 %    Neutrophils, Absolute 4.76 1.70 - 7.00 10*3/mm3    Lymphocytes, Absolute 2.05 0.70 - 3.10 10*3/mm3    Monocytes, Absolute 1.15 (H) 0.10 - 0.90 10*3/mm3    Eosinophils, Absolute 0.26 0.00 - 0.40 10*3/mm3    Basophils, Absolute 0.12 0.00 - 0.20 10*3/mm3    Immature Grans, Absolute 0.02 0.00 - 0.05 10*3/mm3    nRBC 0.0 0.0 - 0.2 /100 WBC   Protime-INR    Collection Time: 05/10/24  6:05 PM    Specimen: Arm, Left; Blood   Result Value Ref Range    Protime 14.4 (H) 11.7 - 14.2 Seconds    INR 1.10 0.90 - 1.10   Lipase    Collection Time: 05/10/24  6:05 PM    Specimen: Arm, Left; Blood   Result Value Ref Range    Lipase 87 (H) 13 - 60 U/L   D-dimer, Quantitative    Collection Time: 05/10/24  6:05 PM    Specimen: Arm, Left; Blood   Result Value Ref Range    D-Dimer, Quantitative <0.27 0.00 - 0.74 MCGFEU/mL   Urinalysis With Microscopic If Indicated (No Culture) - Urine, Clean Catch    Collection Time: 05/10/24  7:47 PM    Specimen: Urine, Clean Catch   Result Value Ref Range    Color, UA Yellow Yellow, Straw    Appearance, UA Clear Clear    pH, UA 5.5 5.0 - 8.0    Specific Gravity, UA >1.030 (H) 1.005 - 1.030    Glucose, UA Negative Negative    Ketones, UA Negative Negative     Bilirubin, UA Negative Negative    Blood, UA Negative Negative    Protein, UA Negative Negative    Leuk Esterase, UA Negative Negative    Nitrite, UA Negative Negative    Urobilinogen, UA 1.0 E.U./dL 0.2 - 1.0 E.U./dL   High Sensitivity Troponin T 2Hr    Collection Time: 05/10/24  8:32 PM    Specimen: Blood   Result Value Ref Range    HS Troponin T 11 <22 ng/L    Troponin T Delta 0 >=-4 - <+4 ng/L       Ordered the above labs and reviewed the results.        RADIOLOGY  CT Angiogram Chest, CT Angiogram Abdomen Pelvis    Result Date: 5/10/2024  CT ANGIOGRAM OF THE CHEST, abdomen, and pelvis. MULTIPLE CORONAL, SAGITTAL, AND 3-D RECONSTRUCTIONS.  HISTORY: 73-year-old male with epigastric pain and back pain. Nausea and diaphoresis.  TECHNIQUE: Radiation dose reduction techniques were utilized, including automated exposure control and exposure modulation based on body size. Cardiac gated CT angiogram of the chest, abdomen, and pelvis was performed following the administration of IV contrast. Multiple coronal, sagittal, and 3-D reconstruction images were obtained. There are no priors for comparison.  FINDINGS: 1. There is no aneurysmal dilatation of the thoracic or abdominal aorta and there is no evidence for a dissection. Aortic root measures 3.2 cm, ascending thoracic aorta 3.0 cm, descending thoracic aorta 2.5 cm, mid abdominal aorta 2.0 cm.  The celiac artery, SMA, LEROY, renal arteries, common iliac, internal and external iliac, common femoral arteries are patent. No incidental pulmonary thromboemboli are seen.  2. There are dependent atelectatic changes at the lower lobes. There is no evidence for pneumonia and there are no pleural or pericardial effusions.  3. There are shotty nodes at the mediastinum and nuris and there are a few borderline enlarged axillary nodes. One of the largest axillary nodes is on the right measuring 1.6 x 1.1 cm. There are shotty and mildly enlarged nodes at the retroperitoneum and pelvis. One  of the largest nodes is adjacent to the right external iliac vessels measuring 2.9 x 1.3 cm. A node adjacent to the IV CT at the mid abdomen measures 1.7 x 1.4 cm. There are no pathologically enlarged nodes at the ilsa hepatis. The spleen measures 15 cm in diameter. FOLLOW-UP FOR THE MILD LYMPHADENOPATHY AND SPLENOMEGALY IS RECOMMENDED.  4. At the pancreas, there is a hyperdense 6 mm nodular density with pancreatic ductal dilatation adjacently, series 8 images 154-157. The hyperdensity is not seen on the precontrast series and therefore likely represents a hyperenhancing nodule such as can be seen with a neuroendocrine tumor. ENDOSCOPIC ULTRASOUND AND FNA IS RECOMMENDED.  5. The liver, adrenals, and kidneys appear unremarkable. There is a subcentimeter right renal cyst. There is no acute bowel abnormality. Appendix appears within normal limits. The prostate is very heterogeneous and measures 5.7 cm in diameter. The heterogeneity may be related to a TURP defect, but PSA and urology follow-up is recommended.      XR Chest 1 View    Result Date: 5/10/2024  XR CHEST 1 VW-  HISTORY: 73-year-old male with chest pain.  FINDINGS: Limited apical lordotic projection and the left costophrenic angle is not fully included. There is no convincing evidence for CHF or pneumonia, but follow-up with upright 2 views of the chest is recommended.  This report was finalized on 5/10/2024 6:31 PM by Dr. Emily Alex M.D on Workstation: [id number]       Ordered the above noted radiological studies. Reviewed by me in PACS.            PROCEDURES  Procedures          MEDICATIONS GIVEN IN ER  Medications   sodium chloride 0.9 % flush 10 mL (10 mL Intravenous Given 5/10/24 1819)   aspirin tablet 325 mg (325 mg Oral Not Given 5/10/24 1815)   sodium chloride 0.9 % infusion (has no administration in time range)   famotidine (PEPCID) injection 20 mg (has no administration in time range)   ondansetron ODT (ZOFRAN-ODT) disintegrating tablet 4 mg  (has no administration in time range)   morphine injection 4 mg (has no administration in time range)   ondansetron (ZOFRAN) injection 4 mg (4 mg Intravenous Given 5/10/24 1819)   morphine injection 4 mg (4 mg Intravenous Given 5/10/24 1819)   sodium chloride 0.9 % bolus 500 mL (0 mL Intravenous Stopped 5/10/24 2032)   iopamidol (ISOVUE-370) 76 % injection 95 mL (95 mL Intravenous Given by Other 5/10/24 1913)             MEDICAL DECISION MAKING, PROGRESS, and CONSULTS    All labs have been independently reviewed by me.  All radiology studies have been reviewed by me and I have also reviewed the radiology report.   EKG's independently viewed and interpreted by me.  Discussion below represents my analysis of pertinent findings related to patient's condition, differential diagnosis, treatment plan and final disposition.      Additional sources:  - Discussed/ obtained information from independent historians: Wife states that he was profusely sweating and in significant pain and they were worried he was having a heart attack    - External (non-ED) record review: I was able to find an old EKG on the patient which showed he does have a history of a right bundle branch block    - Chronic or social conditions impacting care: Patient is visiting from [place]    - Shared decision making: After a shared decision-making discussion with myself, the patient and his wife we agree he needs to admitted to the hospital for further evaluation and care      Orders placed during this visit:  Orders Placed This Encounter   Procedures    XR Chest 1 View    CT Angiogram Chest    CT Angiogram Abdomen Pelvis    [place] Draw    Comprehensive Metabolic Panel    High Sensitivity Troponin T    CBC Auto Differential    Protime-INR    Lipase    Urinalysis With Microscopic If Indicated (No Culture) - Urine, Clean Catch    D-dimer, Quantitative    High Sensitivity Troponin T 2Hr    NPO Diet NPO Type: Strict NPO    Undress & Gown    Continuous Pulse  Oximetry    Oxygen Therapy- Nasal Cannula; Titrate 1-6 LPM Per SpO2; 90 - 95%    ECG 12 Lead ED Triage Standing Order; Chest Pain    ECG 12 Lead ED Triage Standing Order; Chest Pain    Telemetry Scan    Telemetry Scan    Insert Peripheral IV    Initiate ED Observation Status    CBC & Differential    Green Top (Gel)    Lavender Top    Gold Top - SST    Light Blue Top         Differential diagnosis:  My differential diagnosis includes but is not limited to myocardial infarction, acute coronary syndrome, pericarditis, chest wall pain, pneumonia, pulmonary embolism, pneumothorax, or esophageal spasm.      Independent interpretation of labs, radiology studies, and discussions with consultants:  ED Course as of 05/10/24 2126   Fri May 10, 2024   1824 EKG    EKG time: 1747  Rhythm/Rate: Normal sinus rhythm at 73  Right bundle branch block  No Acute Ischemia  Non-Specific ST-T changes  No old EKG for comparison    Interpreted Contemporaneously by me.  Independently viewed by me     [GP]   1938 My independent interpretation the patient's chest x-ray is no pneumonia or CHF [GP]   1952 The patient's CTA chest/abdomen/pelvis showed nothing acute.  His dimer is negative and his initial troponin is normal [GP]   2101 Patient's second troponin is 11 which gives him a delta of 0.  His lipase is mildly elevated at 87.  The patient does have some chronic kidney disease with a creatinine 1.54. [GP]   2119 Examination the patient is feeling better but still complains of 2/10 in his mid epigastrium.  This is reproducible with palpation.  I will give him Pepcid and another dose of pain medicine.  I advised him of his increased creatinine we will give him IV fluids.  At this time I believe he needs to be admitted to the hospital for cardiology and GI consultation.  Of note is I was able to find in the patient's records that he does have a history of a right bundle branch block. [GP]   2124 I discussed the case with Milagros Lindsey.  She is  aware of the patient's significant epigastric discomfort that is now improved.  She is aware the patient has an old right bundle branch block and 2 negative troponins.  We also discussed the patient's mildly elevated lipase and abnormal CT scan.  She will admit the patient to the observation unit for cardiology and GI consultation in the morning. [GP]      ED Course User Index  [GP] Jarvis Santa MD               DIAGNOSIS  Final diagnoses:   Epigastric pain   Renal insufficiency   Right bundle branch block         DISPOSITION  ADMISSION    Discussed treatment plan and reason for admission with pt/family and admitting physician.  Pt/family voiced understanding of the plan for admission for further testing/treatment as needed.            Latest Documented Vital Signs:  As of 21:26 EDT  BP- 117/76 HR- 84 Temp- 96.3 °F (35.7 °C) O2 sat- 97%--      --------------------  Please note that portions of this were completed with a voice recognition program.       Note Disclaimer: At [facility], we believe that sharing information builds trust and better relationships. You are receiving this note because you are receiving care at [facility] or recently visited. It is possible you will see health information before a provider has talked with you about it. This kind of information can be easy to misunderstand. To help you fully understand what it means for your health, we urge you to discuss this note with your provider.             Jarvis Santa MD  05/10/24 3462

## 2024-05-11 ENCOUNTER — APPOINTMENT (OUTPATIENT)
Dept: MRI IMAGING | Facility: HOSPITAL | Age: 74
End: 2024-05-11
Payer: MEDICARE

## 2024-05-11 ENCOUNTER — APPOINTMENT (OUTPATIENT)
Dept: CARDIOLOGY | Facility: HOSPITAL | Age: 74
End: 2024-05-11
Payer: MEDICARE

## 2024-05-11 LAB
ALBUMIN SERPL-MCNC: 3.2 G/DL (ref 3.5–5.2)
ALBUMIN/GLOB SERPL: 1.3 G/DL
ALP SERPL-CCNC: 109 U/L (ref 39–117)
ALT SERPL W P-5'-P-CCNC: 132 U/L (ref 1–41)
AMYLASE SERPL-CCNC: 60 U/L (ref 28–100)
ANION GAP SERPL CALCULATED.3IONS-SCNC: 11.1 MMOL/L (ref 5–15)
AORTIC DIMENSIONLESS INDEX: 0.8 (DI)
ASCENDING AORTA: 3.2 CM
AST SERPL-CCNC: 151 U/L (ref 1–40)
BH CV ECHO MEAS - AO MAX PG: 5.9 MMHG
BH CV ECHO MEAS - AO MEAN PG: 2.9 MMHG
BH CV ECHO MEAS - AO V2 MAX: 121.1 CM/SEC
BH CV ECHO MEAS - AO V2 VTI: 27.7 CM
BH CV ECHO MEAS - AVA(I,D): 2.49 CM2
BH CV ECHO MEAS - EDV(CUBED): 101.4 ML
BH CV ECHO MEAS - EDV(MOD-SP2): 79 ML
BH CV ECHO MEAS - EDV(MOD-SP4): 82 ML
BH CV ECHO MEAS - EF(MOD-BP): 65.9 %
BH CV ECHO MEAS - EF(MOD-SP2): 67.1 %
BH CV ECHO MEAS - EF(MOD-SP4): 62.2 %
BH CV ECHO MEAS - ESV(CUBED): 27.6 ML
BH CV ECHO MEAS - ESV(MOD-SP2): 26 ML
BH CV ECHO MEAS - ESV(MOD-SP4): 31 ML
BH CV ECHO MEAS - FS: 35.2 %
BH CV ECHO MEAS - IVS/LVPW: 1 CM
BH CV ECHO MEAS - IVSD: 1 CM
BH CV ECHO MEAS - LAT PEAK E' VEL: 10.1 CM/SEC
BH CV ECHO MEAS - LV DIASTOLIC VOL/BSA (35-75): 36.6 CM2
BH CV ECHO MEAS - LV MASS(C)D: 162.7 GRAMS
BH CV ECHO MEAS - LV MAX PG: 3.3 MMHG
BH CV ECHO MEAS - LV MEAN PG: 1.65 MMHG
BH CV ECHO MEAS - LV SYSTOLIC VOL/BSA (12-30): 13.9 CM2
BH CV ECHO MEAS - LV V1 MAX: 90.4 CM/SEC
BH CV ECHO MEAS - LV V1 VTI: 22.9 CM
BH CV ECHO MEAS - LVIDD: 4.7 CM
BH CV ECHO MEAS - LVIDS: 3 CM
BH CV ECHO MEAS - LVOT AREA: 3 CM2
BH CV ECHO MEAS - LVOT DIAM: 1.96 CM
BH CV ECHO MEAS - LVPWD: 1 CM
BH CV ECHO MEAS - MED PEAK E' VEL: 8.9 CM/SEC
BH CV ECHO MEAS - MV A DUR: 0.2 SEC
BH CV ECHO MEAS - MV A MAX VEL: 66.4 CM/SEC
BH CV ECHO MEAS - MV DEC SLOPE: 481.3 CM/SEC2
BH CV ECHO MEAS - MV DEC TIME: 179 SEC
BH CV ECHO MEAS - MV E MAX VEL: 90 CM/SEC
BH CV ECHO MEAS - MV E/A: 1.36
BH CV ECHO MEAS - MV MAX PG: 3 MMHG
BH CV ECHO MEAS - MV MEAN PG: 1.04 MMHG
BH CV ECHO MEAS - MV P1/2T: 53.3 MSEC
BH CV ECHO MEAS - MV V2 VTI: 25.1 CM
BH CV ECHO MEAS - MVA(P1/2T): 4.1 CM2
BH CV ECHO MEAS - MVA(VTI): 2.8 CM2
BH CV ECHO MEAS - PA ACC TIME: 0.14 SEC
BH CV ECHO MEAS - PA V2 MAX: 90.1 CM/SEC
BH CV ECHO MEAS - PULM A REVS DUR: 0.17 SEC
BH CV ECHO MEAS - PULM A REVS VEL: 35.1 CM/SEC
BH CV ECHO MEAS - PULM DIAS VEL: 56.1 CM/SEC
BH CV ECHO MEAS - PULM S/D: 1.03
BH CV ECHO MEAS - PULM SYS VEL: 58 CM/SEC
BH CV ECHO MEAS - QP/QS: 0.93
BH CV ECHO MEAS - RV MAX PG: 1.67 MMHG
BH CV ECHO MEAS - RV V1 MAX: 64.7 CM/SEC
BH CV ECHO MEAS - RV V1 VTI: 16 CM
BH CV ECHO MEAS - RVOT DIAM: 2.26 CM
BH CV ECHO MEAS - SV(LVOT): 69 ML
BH CV ECHO MEAS - SV(MOD-SP2): 53 ML
BH CV ECHO MEAS - SV(MOD-SP4): 51 ML
BH CV ECHO MEAS - SV(RVOT): 64.3 ML
BH CV ECHO MEAS - SVI(LVOT): 30.8 ML/M2
BH CV ECHO MEAS - SVI(MOD-SP2): 23.7 ML/M2
BH CV ECHO MEAS - SVI(MOD-SP4): 22.8 ML/M2
BH CV ECHO MEAS - TAPSE (>1.6): 2.4 CM
BH CV ECHO MEASUREMENTS AVERAGE E/E' RATIO: 9.47
BH CV XLRA - RV BASE: 3.9 CM
BH CV XLRA - RV LENGTH: 6.7 CM
BH CV XLRA - RV MID: 3.7 CM
BH CV XLRA - TDI S': 10.8 CM/SEC
BILIRUB SERPL-MCNC: 1.1 MG/DL (ref 0–1.2)
BUN SERPL-MCNC: 23 MG/DL (ref 8–23)
BUN/CREAT SERPL: 17.8 (ref 7–25)
CALCIUM SPEC-SCNC: 7.8 MG/DL (ref 8.6–10.5)
CHLORIDE SERPL-SCNC: 107 MMOL/L (ref 98–107)
CO2 SERPL-SCNC: 19.9 MMOL/L (ref 22–29)
CREAT SERPL-MCNC: 1.29 MG/DL (ref 0.76–1.27)
DEPRECATED RDW RBC AUTO: 40.5 FL (ref 37–54)
EGFRCR SERPLBLD CKD-EPI 2021: 58.2 ML/MIN/1.73
ERYTHROCYTE [DISTWIDTH] IN BLOOD BY AUTOMATED COUNT: 13.2 % (ref 12.3–15.4)
GLOBULIN UR ELPH-MCNC: 2.4 GM/DL
GLUCOSE SERPL-MCNC: 102 MG/DL (ref 65–99)
HCT VFR BLD AUTO: 44.5 % (ref 37.5–51)
HGB BLD-MCNC: 15.2 G/DL (ref 13–17.7)
LEFT ATRIUM VOLUME INDEX: 27.1 ML/M2
LEFT ATRIUM VOLUME: 61 ML
LIPASE SERPL-CCNC: 42 U/L (ref 13–60)
MCH RBC QN AUTO: 29.3 PG (ref 26.6–33)
MCHC RBC AUTO-ENTMCNC: 34.2 G/DL (ref 31.5–35.7)
MCV RBC AUTO: 85.9 FL (ref 79–97)
PLATELET # BLD AUTO: 142 10*3/MM3 (ref 140–450)
PMV BLD AUTO: 10 FL (ref 6–12)
POTASSIUM SERPL-SCNC: 3.6 MMOL/L (ref 3.5–5.2)
PROT SERPL-MCNC: 5.6 G/DL (ref 6–8.5)
RBC # BLD AUTO: 5.18 10*6/MM3 (ref 4.14–5.8)
SINUS: 3.2 CM
SODIUM SERPL-SCNC: 138 MMOL/L (ref 136–145)
STJ: 2.8 CM
TROPONIN T SERPL HS-MCNC: 12 NG/L
WBC NRBC COR # BLD AUTO: 6.19 10*3/MM3 (ref 3.4–10.8)

## 2024-05-11 PROCEDURE — 93306 TTE W/DOPPLER COMPLETE: CPT | Performed by: INTERNAL MEDICINE

## 2024-05-11 PROCEDURE — 99204 OFFICE O/P NEW MOD 45 MIN: CPT | Performed by: INTERNAL MEDICINE

## 2024-05-11 PROCEDURE — 83690 ASSAY OF LIPASE: CPT | Performed by: STUDENT IN AN ORGANIZED HEALTH CARE EDUCATION/TRAINING PROGRAM

## 2024-05-11 PROCEDURE — 85027 COMPLETE CBC AUTOMATED: CPT | Performed by: STUDENT IN AN ORGANIZED HEALTH CARE EDUCATION/TRAINING PROGRAM

## 2024-05-11 PROCEDURE — 93306 TTE W/DOPPLER COMPLETE: CPT

## 2024-05-11 PROCEDURE — G0378 HOSPITAL OBSERVATION PER HR: HCPCS

## 2024-05-11 PROCEDURE — 25810000003 SODIUM CHLORIDE 0.9 % SOLUTION: Performed by: STUDENT IN AN ORGANIZED HEALTH CARE EDUCATION/TRAINING PROGRAM

## 2024-05-11 PROCEDURE — 25010000002 ONDANSETRON PER 1 MG: Performed by: STUDENT IN AN ORGANIZED HEALTH CARE EDUCATION/TRAINING PROGRAM

## 2024-05-11 PROCEDURE — 96376 TX/PRO/DX INJ SAME DRUG ADON: CPT

## 2024-05-11 PROCEDURE — 82150 ASSAY OF AMYLASE: CPT | Performed by: NURSE PRACTITIONER

## 2024-05-11 PROCEDURE — 80053 COMPREHEN METABOLIC PANEL: CPT | Performed by: STUDENT IN AN ORGANIZED HEALTH CARE EDUCATION/TRAINING PROGRAM

## 2024-05-11 PROCEDURE — 84484 ASSAY OF TROPONIN QUANT: CPT | Performed by: NURSE PRACTITIONER

## 2024-05-11 PROCEDURE — 74183 MRI ABD W/O CNTR FLWD CNTR: CPT

## 2024-05-11 PROCEDURE — 96361 HYDRATE IV INFUSION ADD-ON: CPT

## 2024-05-11 RX ORDER — LORAZEPAM 1 MG/1
1 TABLET ORAL ONCE
Status: COMPLETED | OUTPATIENT
Start: 2024-05-11 | End: 2024-05-11

## 2024-05-11 RX ADMIN — LORAZEPAM 1 MG: 1 TABLET ORAL at 22:57

## 2024-05-11 RX ADMIN — ONDANSETRON 4 MG: 2 INJECTION INTRAMUSCULAR; INTRAVENOUS at 16:29

## 2024-05-11 RX ADMIN — LISINOPRIL 20 MG: 20 TABLET ORAL at 08:47

## 2024-05-11 RX ADMIN — Medication 10 ML: at 08:47

## 2024-05-11 RX ADMIN — Medication 10 ML: at 22:59

## 2024-05-11 RX ADMIN — SODIUM CHLORIDE 125 ML/HR: 9 INJECTION, SOLUTION INTRAVENOUS at 08:46

## 2024-05-11 RX ADMIN — HYDROCHLOROTHIAZIDE 25 MG: 25 TABLET ORAL at 08:47

## 2024-05-11 NOTE — ED NOTES
"Nursing report ED to floor  Chilango Ramirez  73 y.o.  male    HPI :  HPI (Adult)  Stated Reason for Visit: cp  History Obtained From: patient    Chief Complaint  Chief Complaint   Patient presents with    Chest Pain   Chilango Ramirez is a 73 y.o. male who presents to the ED c/o acute epigastric pain into his back that began approximate 1 hour ago.  The patient states he does not have any history of heart disease.  He states he did have his gallbladder out about 10 years ago.  The patient is in town from [place] visiting family.  The patient states the pain reminds him of what he had before his gallbladder.  Patient denies shortness of breath, lower extremity pain or lower extremity swelling.  He states he has had nausea diaphoresis and radiation to his back.     Admitting doctor:   Tom Ortega MD    Admitting diagnosis:   The primary encounter diagnosis was Epigastric pain. Diagnoses of Renal insufficiency and Right bundle branch block were also pertinent to this visit.    Code status:   Current Code Status       Date Active Code Status Order ID Comments User Context       Not on file            Allergies:   Patient has no known allergies.    Isolation:   No active isolations    Intake and Output  No intake or output data in the 24 hours ending 05/10/24 2132    Weight:       05/10/24  1744   Weight: 104 kg (230 lb)       Most recent vitals:   Vitals:    05/10/24 1744 05/10/24 1805   BP:  117/76   BP Location:  Right arm   Patient Position:  Sitting   Pulse: 84    Resp: 16    Temp: 96.3 °F (35.7 °C)    SpO2: 97%    Weight: 104 kg (230 lb)    Height: 180.3 cm (71\")        Active LDAs/IV Access:   Lines, Drains & Airways       Active LDAs       Name Placement date Placement time Site Days    Peripheral IV 05/10/24 1815 Right Antecubital 05/10/24  1815  Antecubital  less than 1                    Labs (abnormal labs have a star):   Labs Reviewed   COMPREHENSIVE METABOLIC PANEL - Abnormal; Notable for the following " components:       Result Value    Glucose 102 (*)     BUN 24 (*)     Creatinine 1.54 (*)     Potassium 3.4 (*)     eGFR 47.0 (*)     All other components within normal limits    Narrative:     GFR Normal >60  Chronic Kidney Disease <60  Kidney Failure <15    The GFR formula is only valid for adults with stable renal function between ages 18 and 70.   CBC WITH AUTO DIFFERENTIAL - Abnormal; Notable for the following components:    Monocyte % 13.8 (*)     Monocytes, Absolute 1.15 (*)     All other components within normal limits   PROTIME-INR - Abnormal; Notable for the following components:    Protime 14.4 (*)     All other components within normal limits   LIPASE - Abnormal; Notable for the following components:    Lipase 87 (*)     All other components within normal limits   URINALYSIS W/ MICROSCOPIC IF INDICATED (NO CULTURE) - Abnormal; Notable for the following components:    Specific Gravity, UA >1.030 (*)     All other components within normal limits    Narrative:     Urine microscopic not indicated.   TROPONIN - Normal    Narrative:     High Sensitive Troponin T Reference Range:  <14.0 ng/L- Negative Female for AMI  <22.0 ng/L- Negative Male for AMI  >=14 - Abnormal Female indicating possible myocardial injury.  >=22 - Abnormal Male indicating possible myocardial injury.   Clinicians would have to utilize clinical acumen, EKG, Troponin, and serial changes to determine if it is an Acute Myocardial Infarction or myocardial injury due to an underlying chronic condition.        D-DIMER, QUANTITATIVE - Normal    Narrative:     According to the assay 's published package insert, a normal (<0.50 MCGFEU/mL) D-dimer result in conjunction with a non-high clinical probability assessment, excludes deep vein thrombosis (DVT) and pulmonary embolism (PE) with high sensitivity.    D-dimer values increase with age and this can make VTE exclusion of an older population difficult. To address this, the American College  "of Physicians, based on best available evidence and recent guidelines, recommends that clinicians use age-adjusted D-dimer thresholds in patients greater than 50 years of age with: a) a low probability of PE who do not meet all Pulmonary Embolism Rule Out Criteria, or b) in those with intermediate probability of PE.   The formula for an age-adjusted D-dimer cut-off is \"age/100\".  For example, a 60 year old patient would have an age-adjusted cut-off of 0.60 MCGFEU/mL and an 80 year old 0.80 MCGFEU/mL.   HIGH SENSITIVITIY TROPONIN T 2HR - Normal    Narrative:     High Sensitive Troponin T Reference Range:  <14.0 ng/L- Negative Female for AMI  <22.0 ng/L- Negative Male for AMI  >=14 - Abnormal Female indicating possible myocardial injury.  >=22 - Abnormal Male indicating possible myocardial injury.   Clinicians would have to utilize clinical acumen, EKG, Troponin, and serial changes to determine if it is an Acute Myocardial Infarction or myocardial injury due to an underlying chronic condition.        RAINBOW DRAW    Narrative:     The following orders were created for panel order [place] Draw.  Procedure                               Abnormality         Status                     ---------                               -----------         ------                     Green Top (Gel)[[id number]]                                  Final result               Lavender Top[[id number]]                                     Final result               Gold Top - SST[[id number]]                                   Final result               Light Blue Top[[id number]]                                   Final result                 Please view results for these tests on the individual orders.   CBC AND DIFFERENTIAL    Narrative:     The following orders were created for panel order CBC & Differential.  Procedure                               Abnormality         Status                     ---------                               -----------    "      ------                     CBC Auto Differential[[id number]]        Abnormal            Final result                 Please view results for these tests on the individual orders.   GREEN TOP   LAVENDER TOP   GOLD TOP - SST   LIGHT BLUE TOP       EKG:   ECG 12 Lead ED Triage Standing Order; Chest Pain   Final Result   HEART RATE= 73  bpm   RR Interval= 822  ms   MT Interval= 178  ms   P Horizontal Axis= 7  deg   P Front Axis= 65  deg   QRSD Interval= 144  ms   QT Interval= 424  ms   QTcB= 468  ms   QRS Axis= 27  deg   T Wave Axis= 21  deg   - ABNORMAL ECG -   Sinus rhythm   Right bundle branch block   No Prior Tracing for Comparison   Electronically Signed By: Nikhil Soares ([facility]) 10-May-2024 18:23:02   Date and Time of Study: 2024-05-10 17:47:10      Telemetry Scan   Final Result      Telemetry Scan   Final Result          Meds given in ED:   Medications   sodium chloride 0.9 % flush 10 mL (10 mL Intravenous Given 5/10/24 1819)   aspirin tablet 325 mg (325 mg Oral Not Given 5/10/24 1815)   sodium chloride 0.9 % infusion (has no administration in time range)   famotidine (PEPCID) injection 20 mg (has no administration in time range)   ondansetron ODT (ZOFRAN-ODT) disintegrating tablet 4 mg (has no administration in time range)   morphine injection 4 mg (has no administration in time range)   ondansetron (ZOFRAN) injection 4 mg (4 mg Intravenous Given 5/10/24 1819)   morphine injection 4 mg (4 mg Intravenous Given 5/10/24 1819)   sodium chloride 0.9 % bolus 500 mL (0 mL Intravenous Stopped 5/10/24 2032)   iopamidol (ISOVUE-370) 76 % injection 95 mL (95 mL Intravenous Given by Other 5/10/24 1913)       Imaging results:  No radiology results for the last day    Ambulatory status:   - Aox4, independent     Social issues:   Social History     Socioeconomic History    Marital status:    Tobacco Use    Smoking status: Never    Smokeless tobacco: Never   Substance and Sexual Activity    Alcohol use: No        Peripheral Neurovascular  Peripheral Neurovascular (Adult)  Peripheral Neurovascular WDL: WDL, pulse assessment  Pulse Assessment: radial    Neuro Cognitive  Neuro Cognitive (Adult)  Cognitive/Neuro/Behavioral WDL: WDL, arousability, orientation  Arousal Level: opens eyes spontaneously  Orientation: oriented x 4    Learning  Learning Assessment (Adult)  Learning Readiness and Ability: no barriers identified  Education Provided  Person Taught: patient, spouse    Respiratory  Respiratory WDL  Respiratory WDL: WDL    Abdominal Pain       Pain Assessments  Pain (Adult)  (0-10) Pain Rating: Rest: 8  (0-10) Pain Rating: Activity: 9  Pain Location: abdomen, chest  Pain Side/Orientation: upper, medial    NIH Stroke Scale       Rosie Barnes RN  05/10/24 21:32 EDT

## 2024-05-11 NOTE — CONSULTS
Date of Consultation: 24    Referral Provider: Jarvis Santa MD     Reason for Consultation: Epigastric pain.    Encounter Provider: Johnnie Walters MD    Group of Service: [facility]     Patient Name: Chilango Ramirez     :1950    Chief complaint:  Epigastric Pain     History of Present Illness:      This is a very pleasant 73 year-old male with a history of hypertension, hyperlipidemia, chronic kidney disease.  He is visiting family in [place] from [place].  He developed significant pain in his epigastrium which was sudden onset, and came to the ER on 5/10/2024.  He states that this was burning and intense, and was associated with diaphoresis and nausea.  He did not actually have chest pain.  His EKG showed a right bundle branch block without ischemia, and his troponin was negative.  However, his AST was 151 and ALT was 132.  His initial lipase was 87 but has now normalized.  His EKG eventually abated after morphine.    He had a CT angiogram of the chest and abdomen which showed no pulmonary embolism or aortic pathology.  However, there were multiple enlarged lymph nodes, including in the axillary and pelvic area.  He also had evidence of splenomegaly, as well as a hyperdense nodular density within the pancreas with pancreatic ductal dilatation.        Past Medical History:   Diagnosis Date    CKD (chronic kidney disease)     Hypertension     Urine abnormality          Past Surgical History:   Procedure Laterality Date    CHOLECYSTECTOMY      PROSTATE SURGERY           No Known Allergies      No current facility-administered medications on file prior to encounter.     Current Outpatient Medications on File Prior to Encounter   Medication Sig Dispense Refill    acetaminophen (TYLENOL) 500 MG tablet Take 2 tablets by mouth Every 4 (Four) Hours As Needed.      lisinopril-hydrochlorothiazide (PRINZIDE,ZESTORETIC) 20-25 MG per tablet Take 1 tablet by mouth.      ASPIRIN 81 PO Take 81  "mg by mouth.      tamsulosin (FLOMAX) 0.4 MG capsule 24 hr capsule Take 1 capsule by mouth.           Social History     Socioeconomic History    Marital status:    Tobacco Use    Smoking status: Never    Smokeless tobacco: Never   Vaping Use    Vaping status: Never Used   Substance and Sexual Activity    Alcohol use: No    Drug use: Never    Sexual activity: Defer         History reviewed. No pertinent family history.    REVIEW OF SYSTEMS:   Pertinent positives are noted in the HPI above.  Otherwise, all other systems were reviewed, and are negative.     Objective:     Vitals:    05/11/24 0407 05/11/24 0732 05/11/24 1154 05/11/24 1513   BP: 115/75 113/78 134/75 119/69   BP Location: Left arm Left arm Left arm Left arm   Patient Position: Lying Lying Lying Lying   Pulse: 69 65 70 86   Resp: 18 18 18 18   Temp: 97.3 °F (36.3 °C) 98.1 °F (36.7 °C) 98.1 °F (36.7 °C) 98.2 °F (36.8 °C)   TempSrc: Oral Oral Oral Oral   SpO2: 96%  96% 95%   Weight:  104 kg (230 lb)     Height:  180.3 cm (71\")       Body mass index is 32.08 kg/m².  Flowsheet Rows      Flowsheet Row First Filed Value   Admission Height 180.3 cm (71\") Documented at 05/10/2024 1744   Admission Weight 104 kg (230 lb) Documented at 05/10/2024 1744             General:    No acute distress, alert and oriented x4, pleasant                   Head:    Normocephalic, atraumatic.   Eyes:          Conjunctivae and sclerae normal, no icterus.   Throat:   No oral lesions, no thrush, oral mucosa moist.    Neck:   Supple, trachea midline.   Lungs:     Clear to auscultation bilaterally     Heart:    Regular rhythm and normal rate.  No murmurs, gallops, or rubs noted.   Abdomen:     Soft, non-tender, non-distended, positive bowel sounds.    Extremities:   No clubbing, cyanosis, or edema.     Pulses:   Pulses palpable and equal bilaterally.    Skin:   No bleeding or rash.   Neuro:   Non-focal.  Moves all extremities well.    Psychiatric:   Normal mood and affect. "         Lab Review:                Results from last 7 days   Lab Units 05/11/24  0417   SODIUM mmol/L 138   POTASSIUM mmol/L 3.6   CHLORIDE mmol/L 107   CO2 mmol/L 19.9*   BUN mg/dL 23   CREATININE mg/dL 1.29*   GLUCOSE mg/dL 102*   CALCIUM mg/dL 7.8*     Results from last 7 days   Lab Units 05/11/24  1202 05/10/24  2032 05/10/24  1805   HSTROP T ng/L 12 11 11     Results from last 7 days   Lab Units 05/11/24  0417   WBC 10*3/mm3 6.19   HEMOGLOBIN g/dL 15.2   HEMATOCRIT % 44.5   PLATELETS 10*3/mm3 142     Results from last 7 days   Lab Units 05/10/24  1805   INR  1.10                   5-10-24        EKG (reviewed by me personally):      Assessment:   1.  Epigastric pain, noncardiac  2.  Right bundle branch block  3.  Elevated liver function tests  4.  Hyperdense nodular density within the pancreas with associated pancreatic ductal dilatation by CT  5.  Lymphadenopathy  6.  Splenomegaly  7.  Hypertension  8.  Chronic kidney disease    Plan:       His description of the discomfort is very unlikely to be cardiac in nature.  When he arrived, it was actually reproducible.  His high-sensitivity troponin was negative x 3 sets, which is also reassuring.  He does have a right bundle branch block, and I did order an echocardiogram.  This showed an ejection fraction of 60 to 65%, and normal right ventricular function as well.    He is going to require further workup from a GI perspective.  An MRCP has been ordered.  He likely will need further care and testing in [place] for the CT findings once he returns home.    No further cardiac testing is needed.  Cardiology will sign off.  Please call back if needed.    Thank you very much for this consult.    Spike Walters MD

## 2024-05-11 NOTE — PLAN OF CARE
Goal Outcome Evaluation:            Patient awaits MRI. He understands to remain NPO until then. He is ambulatory, alert, oriented. VSS. Wife present. NS infusing.

## 2024-05-11 NOTE — PLAN OF CARE
Goal Outcome Evaluation:  Plan of Care Reviewed With: patient           Outcome Evaluation: oriented, no c/o pain, ambulates standby- assist x1, and IVF @125. GI and Cards consulted

## 2024-05-11 NOTE — PROGRESS NOTES
"MD Attestation Note    I supervised care provided by the midlevel provider.    The BERRY and I have discussed this patient's history, physical exam, and treatment plan. I have reviewed the documentation and personally had a face to face interaction with the patient  I affirm the documentation and agree with the treatment and plan.       My personal findings are:        Subjective:  Patient doing better this morning, no ongoing pain.  No acute events overnight.        Objective:      PHYSICAL EXAM  Vitals:    05/10/24 2201 05/10/24 2248 05/11/24 0407 05/11/24 0732   BP: 133/83 132/77 115/75 113/78   BP Location:  Left arm Left arm Left arm   Patient Position:  Lying Lying Lying   Pulse: 84 87 69 65   Resp:  20 18 18   Temp:  97.9 °F (36.6 °C) 97.3 °F (36.3 °C) 98.1 °F (36.7 °C)   TempSrc:  Oral Oral Oral   SpO2: 96% 100% 96%    Weight:    104 kg (230 lb)   Height:    180.3 cm (71\")         GENERAL: no acute distress  HENT: nares patent  EYES: no scleral icterus  CV: regular rhythm, normal rate  RESPIRATORY: normal effort  ABDOMEN: soft  MUSCULOSKELETAL: no deformity  NEURO: alert, moves all extremities, follows commands  PSYCH:  calm, cooperative  SKIN: warm, dry    Vital signs and nursing notes reviewed.      Assessment/ Plan:  Patient presenting with epigastric/lower chest pain, sudden onset, associated diaphoresis.  Pain is now resolved.  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  Plan for cardiology and gastroenterology evaluation today.  Patient is from out of town, hopeful plan to discharge today with outpatient follow-up once he returns home.    "

## 2024-05-11 NOTE — CONSULTS
[facility]  Initial Inpatient Consult Note    Referring Provider: A    Reason for Consultation: Epigastric pain, abnormal CT findings    Subjective     History of present illness:    73 y.o. male who reported acute onset of epigastric pain yesterday without prodrome and presented to the emergency room.  He had undergone cardiac workup and a CT angio of the chest and abdomen was performed.  The CT revealed shotty mediastinal and hilar lymph nodes as well as borderline enlarged axillary nodes in the spleen revealed enlargement to 15 cm.  In the pancreas there was a hyperdense 6 mm nodular density with ductal dilation adjacent to this and likely represented a hyperenhancing nodule such as can be seen with a neuroendocrine tumor.  Recommendation for endoscopic ultrasound and FNA was recommended.  The liver adrenals and kidneys appeared unremarkable.  It is of note that his  Labs on presentation showed normal LFTs but repeat labs today showed an ALT of 132 and an AST of 151.  Alkaline phosphatase and bilirubin were normal.  Discussed these findings at length with the hospital service, the patient, and his family.  They had multiple questions regarding workup options.  After discussion with the GI service the provide's subspecialty work it was recommended he undergo an MRCP with and without contrast to better define any ductal abnormalities as well as the possibility of a neuroendocrine tumor.  If he has no evidence of impending obstruction he may benefit from travel back to his home in [place] to have GI workup done in that setting.  If he does warrant more emergent evaluation this can be provided through that service at [facility].    Past Medical History:  Past Medical History:   Diagnosis Date    CKD (chronic kidney disease)     Hypertension     Urine abnormality      Past Surgical History:  Past Surgical History:   Procedure Laterality Date    CHOLECYSTECTOMY      PROSTATE  SURGERY        Social History:   Social History     Tobacco Use    Smoking status: Never    Smokeless tobacco: Never   Substance Use Topics    Alcohol use: No      Family History:  History reviewed. No pertinent family history.    Home Meds:  Medications Prior to Admission   Medication Sig Dispense Refill Last Dose    acetaminophen (TYLENOL) 500 MG tablet Take 2 tablets by mouth Every 4 (Four) Hours As Needed.   5/10/2024    lisinopril-hydrochlorothiazide (PRINZIDE,ZESTORETIC) 20-25 MG per tablet Take 1 tablet by mouth.       ASPIRIN 81 PO Take 81 mg by mouth.       tamsulosin (FLOMAX) 0.4 MG capsule 24 hr capsule Take 1 capsule by mouth.        Current Meds:   aspirin, 325 mg, Oral, Once  lisinopril, 20 mg, Oral, Q24H   And  hydroCHLOROthiazide, 25 mg, Oral, Q24H  sodium chloride, 10 mL, Intravenous, Q12H      Allergies:  No Known Allergies  Review of Systems  Pertinent items are noted in HPI, all other systems reviewed and negative     Objective     Vital Signs  Temp:  [96.3 °F (35.7 °C)-98.1 °F (36.7 °C)] 98.1 °F (36.7 °C)  Heart Rate:  [65-87] 70  Resp:  [16-20] 18  BP: (113-142)/(74-88) 134/75  Physical Exam:  General Appearance:    Alert, cooperative, in no acute distress   Head:    Normocephalic, without obvious abnormality, atraumatic   Eyes:          conjunctivae and sclerae normal, no   icterus   Throat:   no thrush, oral mucosa moist   Neck:   Supple, no adenopathy   Lungs:     Clear to auscultation bilaterally    Heart:    Regular rhythm and normal rate    Chest Wall:    No abnormalities observed   Abdomen:     Soft, nondistended, nontender; normal bowel sounds   Extremities:   no edema, no redness   Skin:   No bruising or rash   Psychiatric:  normal mood and insight     Results Review:   I reviewed the patient's new clinical results.    Results from last 7 days   Lab Units 05/11/24  0417 05/10/24  1805   WBC 10*3/mm3 6.19 8.36   HEMOGLOBIN g/dL 15.2 16.0   HEMATOCRIT % 44.5 48.6   PLATELETS 10*3/mm3 142  196     Results from last 7 days   Lab Units 05/11/24  0417 05/10/24  1805   SODIUM mmol/L 138 141   POTASSIUM mmol/L 3.6 3.4*   CHLORIDE mmol/L 107 104   CO2 mmol/L 19.9* 22.0   BUN mg/dL 23 24*   CREATININE mg/dL 1.29* 1.54*   CALCIUM mg/dL 7.8* 8.6   BILIRUBIN mg/dL 1.1 1.0   ALK PHOS U/L 109 83   ALT (SGPT) U/L 132* 22   AST (SGOT) U/L 151* 27   GLUCOSE mg/dL 102* 102*     Results from last 7 days   Lab Units 05/10/24  1805   INR  1.10     Lab Results   Lab Value Date/Time    LIPASE 42 05/11/2024 0417    LIPASE 87 (H) 05/10/2024 1805       Radiology:  CT Angiogram Chest         CT Angiogram Abdomen Pelvis         XR Chest 1 View   Final Result      MRI abdomen w wo contrast mrcp    (Results Pending)       Assessment & Plan   Active Hospital Problems    Diagnosis     **Epigastric pain        Assessment:  Epigastric pain: Resolved, not sure if this was associated with biliary or ductal colic versus other etiology  Abnormal CT findings suggestive of a pancreatic nodule with mild dilation as well as some degree of lymphadenopathy  Elevated LFTs    Plan:  Obtain MRCP with and without contrast  Pending results can entertain further evaluation locally at an institution where he can undergo appropriate workup or referral to the GI service in [facility] where he resides.      I discussed the patients findings and my recommendations with patient, family, nursing staff, primary care team, and consulting provider.    Nolberto Castaneda MD

## 2024-05-11 NOTE — H&P
[facility]   HISTORY AND PHYSICAL    Patient Name: Chilango Ramirez  : 1950  MRN: [id number]  Primary Care Physician:  Provider, No Known  Date of admission: 5/10/2024    Subjective   Subjective     Chief Complaint:   Chief Complaint   Patient presents with    Chest Pain         HPI:    Chilango Ramirez is a 73 y.o. male with a history of hypertension and CKD who presents to [facility] ER with epigastric pain.  Patient states around 6 PM this evening he was about to go to dinner when he started to have sudden onset and severe pain in the upper stomach that radiated to the back.  He rated it as an 8 out of 10 and reports he broke out into a sweat,  had some mild nausea and became a bit lightheaded.  He states that the pain is better after medications given in the ER now.  He denies any shortness of breath or palpitations.  Denies dizziness.  Denies vomiting.  Denies diarrhea and constipation.  Denies any visible blood in the stool or dark tarry quality to the stool.  Denies any pain with urination or change in frequency.  Denies fevers.  He denies any previous cardiac history.  He does report he has had his gallbladder removed in the past and reported the pain felt somewhat similar to that but just in a different area.    Review of Systems   All systems were reviewed and negative except for: what is mentioned above in the HPI.     Personal History     Past Medical History:   Diagnosis Date    CKD (chronic kidney disease)     Hypertension     Urine abnormality        Past Surgical History:   Procedure Laterality Date    CHOLECYSTECTOMY      PROSTATE SURGERY         Family History: family history is not on file. Otherwise pertinent FHx was reviewed and not pertinent to current issue.    Social History:  reports that he has never smoked. He has never used smokeless tobacco. He reports that he does not drink alcohol.    Home Medications:  Aspirin, acetaminophen, lisinopril-hydrochlorothiazide, and  tamsulosin    Allergies:  No Known Allergies    Objective   Objective     Vitals:   Temp:  [96.3 °F (35.7 °C)-97.9 °F (36.6 °C)] 97.9 °F (36.6 °C)  Heart Rate:  [81-87] 87  Resp:  [16-20] 20  BP: (117-142)/(74-88) 132/77  Physical Exam    Constitutional: 73-year-old male in no acute distress on room air   Eyes: PERRLA, sclerae anicteric, no conjunctival injection   HENT: NCAT, mucous membranes moist   Neck: Supple, no thyromegaly, no lymphadenopathy, trachea midline   Respiratory: Clear to auscultation bilaterally, nonlabored respirations    Cardiovascular: RRR, no murmurs, rubs, or gallops, palpable pedal pulses bilaterally   Gastrointestinal: Positive bowel sounds, soft, epigastric tenderness to palpation without guarding or rebound nondistended   Musculoskeletal: No bilateral ankle edema, no clubbing or cyanosis to extremities   Psychiatric: Appropriate affect, cooperative   Neurologic: Oriented x 3, strength symmetric in all extremities, Cranial Nerves grossly intact to confrontation, speech clear   Skin: No rashes     Result Review    Result Review:  I have personally reviewed the results from the time of this admission to 5/10/2024 23:19 EDT and agree with these findings:  [x]  Laboratory list / accordion  []  Microbiology  [x]  Radiology  [x]  EKG/Telemetry   []  Cardiology/Vascular   []  Pathology  [x]  Old records  []  Other:  Most notable findings include:     CT Angiogram Chest    Result Date: 5/10/2024  CT ANGIOGRAM OF THE CHEST, abdomen, and pelvis. MULTIPLE CORONAL, SAGITTAL, AND 3-D RECONSTRUCTIONS.  HISTORY: 73-year-old male with epigastric pain and back pain. Nausea and diaphoresis.  TECHNIQUE: Radiation dose reduction techniques were utilized, including automated exposure control and exposure modulation based on body size. Cardiac gated CT angiogram of the chest, abdomen, and pelvis was performed following the administration of IV contrast. Multiple coronal, sagittal, and 3-D reconstruction images  were obtained. There are no priors for comparison.  FINDINGS: 1. There is no aneurysmal dilatation of the thoracic or abdominal aorta and there is no evidence for a dissection. Aortic root measures 3.2 cm, ascending thoracic aorta 3.0 cm, descending thoracic aorta 2.5 cm, mid abdominal aorta 2.0 cm.  The celiac artery, SMA, LEROY, renal arteries, common iliac, internal and external iliac, common femoral arteries are patent. No incidental pulmonary thromboemboli are seen.  2. There are dependent atelectatic changes at the lower lobes. There is no evidence for pneumonia and there are no pleural or pericardial effusions.  3. There are shotty nodes at the mediastinum and nuris and there are a few borderline enlarged axillary nodes. One of the largest axillary nodes is on the right measuring 1.6 x 1.1 cm. There are shotty and mildly enlarged nodes at the retroperitoneum and pelvis. One of the largest nodes is adjacent to the right external iliac vessels measuring 2.9 x 1.3 cm. A node adjacent to the IV CT at the mid abdomen measures 1.7 x 1.4 cm. There are no pathologically enlarged nodes at the ilsa hepatis. The spleen measures 15 cm in diameter. FOLLOW-UP FOR THE MILD LYMPHADENOPATHY AND SPLENOMEGALY IS RECOMMENDED.  4. At the pancreas, there is a hyperdense 6 mm nodular density with pancreatic ductal dilatation adjacently, series 8 images 154-157. The hyperdensity is not seen on the precontrast series and therefore likely represents a hyperenhancing nodule such as can be seen with a neuroendocrine tumor. ENDOSCOPIC ULTRASOUND AND FNA IS RECOMMENDED.  5. The liver, adrenals, and kidneys appear unremarkable. There is a subcentimeter right renal cyst. There is no acute bowel abnormality. Appendix appears within normal limits. The prostate is very heterogeneous and measures 5.7 cm in diameter. The heterogeneity may be related to a TURP defect, but PSA and urology follow-up is recommended.      CT Angiogram Abdomen  Pelvis    Result Date: 5/10/2024  CT ANGIOGRAM OF THE CHEST, abdomen, and pelvis. MULTIPLE CORONAL, SAGITTAL, AND 3-D RECONSTRUCTIONS.  HISTORY: 73-year-old male with epigastric pain and back pain. Nausea and diaphoresis.  TECHNIQUE: Radiation dose reduction techniques were utilized, including automated exposure control and exposure modulation based on body size. Cardiac gated CT angiogram of the chest, abdomen, and pelvis was performed following the administration of IV contrast. Multiple coronal, sagittal, and 3-D reconstruction images were obtained. There are no priors for comparison.  FINDINGS: 1. There is no aneurysmal dilatation of the thoracic or abdominal aorta and there is no evidence for a dissection. Aortic root measures 3.2 cm, ascending thoracic aorta 3.0 cm, descending thoracic aorta 2.5 cm, mid abdominal aorta 2.0 cm.  The celiac artery, SMA, LEROY, renal arteries, common iliac, internal and external iliac, common femoral arteries are patent. No incidental pulmonary thromboemboli are seen.  2. There are dependent atelectatic changes at the lower lobes. There is no evidence for pneumonia and there are no pleural or pericardial effusions.  3. There are shotty nodes at the mediastinum and nuris and there are a few borderline enlarged axillary nodes. One of the largest axillary nodes is on the right measuring 1.6 x 1.1 cm. There are shotty and mildly enlarged nodes at the retroperitoneum and pelvis. One of the largest nodes is adjacent to the right external iliac vessels measuring 2.9 x 1.3 cm. A node adjacent to the IV CT at the mid abdomen measures 1.7 x 1.4 cm. There are no pathologically enlarged nodes at the ilsa hepatis. The spleen measures 15 cm in diameter. FOLLOW-UP FOR THE MILD LYMPHADENOPATHY AND SPLENOMEGALY IS RECOMMENDED.  4. At the pancreas, there is a hyperdense 6 mm nodular density with pancreatic ductal dilatation adjacently, series 8 images 154-157. The hyperdensity is not seen on the  precontrast series and therefore likely represents a hyperenhancing nodule such as can be seen with a neuroendocrine tumor. ENDOSCOPIC ULTRASOUND AND FNA IS RECOMMENDED.  5. The liver, adrenals, and kidneys appear unremarkable. There is a subcentimeter right renal cyst. There is no acute bowel abnormality. Appendix appears within normal limits. The prostate is very heterogeneous and measures 5.7 cm in diameter. The heterogeneity may be related to a TURP defect, but PSA and urology follow-up is recommended.      XR Chest 1 View    Result Date: 5/10/2024  XR CHEST 1 VW-  HISTORY: 73-year-old male with chest pain.  FINDINGS: Limited apical lordotic projection and the left costophrenic angle is not fully included. There is no convincing evidence for CHF or pneumonia, but follow-up with upright 2 views of the chest is recommended.  This report was finalized on 5/10/2024 6:31 PM by Dr. Emily Alex M.D on Workstation: [id number]           Assessment & Plan   Assessment / Plan     Brief Patient Summary:  Chilango Ramirez is a 73 y.o. male who admitted the observation unit for further evaluation of epigastric pain.  In the ER, high-sensitivity troponins and D-dimer negative.  EKG shows sinus rhythm with right bundle branch block that is unchanged from previous EKG.  CTA of the chest showed no evidence of aneurysm, dissection, or PE.  CTA of the abdomen and pelvis shows some mild lymphadenopathy and splenomegaly as well as a nodular density with pancreatic duct dilatation.  Urinalysis clean.  Lab work noted a lipase at 87, potassium 3.4 and a creatinine at 1.54 that appears at patient's baseline.  Plan for IV fluid hydration overnight, trend troponins, telemetry monitoring and consults to GI and cardiology.    Active Hospital Problems:  Active Hospital Problems    Diagnosis     **Epigastric pain      Plan:     Epigastric pain  -High-sensitivity troponin 11, 11.  Trend  -D-dimer negative  -EKG  sinus rhythm, chronic right  bundle branch block, no acute ischemia  -CTA chest, abdomen and pelvis notes mild lymphadenopathy and splenomegaly as well as a nodular density with pancreatic duct dilatation  -Lipase 87  -Cardiology consulted   -GI consulted  -IV fluids  -Analgesics and antiemetics as needed  -N.p.o. after midnight    Hypertension  -Continue lisinopril hydrochlorothiazide  -Monitor vital signs every 4 hours    CKD  -Creatinine 1.54  -Care everywhere records show previous creatinine 1.4-1.5 since 2021  -Appears patient is at baseline  -Trend with a.m. labs    DVT prophylaxis:  Mechanical DVT prophylaxis orders are present.        CODE STATUS:    Code Status (Patient has no pulse and is not breathing): CPR (Attempt to Resuscitate)  Medical Interventions (Patient has pulse or is breathing): Full Support    Admission Status:  I believe this patient meets observation status.    77 minutes have been spent by [facility] providers in the care of this patient while under observation status.      Appropriate PPE worn during patient encounter.  Hand hygeine performed before and after seeing the patient.      Electronically signed by Payton Lindsey PA-C, 05/10/24, 10:27 PM EDT.

## 2024-05-11 NOTE — PROGRESS NOTES
ED OBSERVATION PROGRESS/DISCHARGE SUMMARY    Date of Admission: 5/10/2024   LOS: 0 days   PCP: Provider, No Known    Final Diagnosis epigastric pain      Subjective     Hospital Outcome:   Chilango Ramirez is a 73 y.o. male who admitted the observation unit for further evaluation of epigastric pain.  In the ER, high-sensitivity troponins and D-dimer negative.  EKG shows sinus rhythm with right bundle branch block that is unchanged from previous EKG.  CTA of the chest showed no evidence of aneurysm, dissection, or PE.  CTA of the abdomen and pelvis shows some mild lymphadenopathy and splenomegaly as well as a nodular density with pancreatic duct dilatation.  Urinalysis clean.  Lab work noted a lipase at 87, potassium 3.4 and a creatinine at 1.54 that appears at patient's baseline.  Plan for IV fluid hydration overnight, trend troponins, telemetry monitoring and consults to GI and cardiology.     5/11  Evaluated by cardiology.  He had an echocardiogram which showed normal LV systolic function with an EF of 61 to 65%.  Trace mitral valve regurgitation present.  Trivial pericardial effusion noted.  No further workup indicated from their standpoint.  Gastroenterology also evaluated the patient.  They have recommended an MRCP.  This cannot be performed until later this evening.  If it is unremarkable the patient may be discharged home with plans to follow-up with his primary doctor in [place].    ROS:  General: no fevers, chills  Respiratory: no cough, dyspnea  Cardiovascular: no chest pain, palpitations  Abdomen: No abdominal pain, nausea, vomiting, or diarrhea  Neurologic: No focal weakness    Objective   Physical Exam:  I have reviewed the vital signs.  Temp:  [96.3 °F (35.7 °C)-98.2 °F (36.8 °C)] 98.2 °F (36.8 °C)  Heart Rate:  [65-87] 86  Resp:  [16-20] 18  BP: (113-142)/(69-88) 119/69  General Appearance:    Alert, cooperative, no distress  Head:    Normocephalic, atraumatic  Eyes:    Sclerae anicteric  Neck:   Supple, no  mass  Lungs: Clear to auscultation bilaterally, respirations unlabored  Heart: Regular rate and rhythm, S1 and S2 normal, no murmur, rub or gallop  Abdomen:  Soft, non-tender, bowel sounds active, nondistended  Extremities: No clubbing, cyanosis, or edema to lower extremities  Pulses:  2+ and symmetric in distal lower extremities  Skin: No rashes   Neurologic: Oriented x3, Normal strength to extremities    Results Review:    I have reviewed the labs, radiology results and diagnostic studies.    Results from last 7 days   Lab Units 05/11/24  0417   WBC 10*3/mm3 6.19   HEMOGLOBIN g/dL 15.2   HEMATOCRIT % 44.5   PLATELETS 10*3/mm3 142     Results from last 7 days   Lab Units 05/11/24 0417 05/10/24  1805   SODIUM mmol/L 138 141   POTASSIUM mmol/L 3.6 3.4*   CHLORIDE mmol/L 107 104   CO2 mmol/L 19.9* 22.0   BUN mg/dL 23 24*   CREATININE mg/dL 1.29* 1.54*   CALCIUM mg/dL 7.8* 8.6   BILIRUBIN mg/dL 1.1 1.0   ALK PHOS U/L 109 83   ALT (SGPT) U/L 132* 22   AST (SGOT) U/L 151* 27   GLUCOSE mg/dL 102* 102*     Imaging Results (Last 24 Hours)       Procedure Component Value Units Date/Time    CT Angiogram Chest [[id number]] Collected: 05/10/24 1941     Updated: 05/10/24 1941    Narrative:      CT ANGIOGRAM OF THE CHEST, abdomen, and pelvis. MULTIPLE CORONAL,  SAGITTAL, AND 3-D RECONSTRUCTIONS.     HISTORY: 73-year-old male with epigastric pain and back pain. Nausea and  diaphoresis.     TECHNIQUE: Radiation dose reduction techniques were utilized, including  automated exposure control and exposure modulation based on body size.  Cardiac gated CT angiogram of the chest, abdomen, and pelvis was  performed following the administration of IV contrast. Multiple coronal,  sagittal, and 3-D reconstruction images were obtained. There are no  priors for comparison.     FINDINGS:  1. There is no aneurysmal dilatation of the thoracic or abdominal aorta  and there is no evidence for a dissection. Aortic root measures 3.2 cm,  ascending  thoracic aorta 3.0 cm, descending thoracic aorta 2.5 cm, mid  abdominal aorta 2.0 cm.     The celiac artery, SMA, LEROY, renal arteries, common iliac, internal and  external iliac, common femoral arteries are patent. No incidental  pulmonary thromboemboli are seen.     2. There are dependent atelectatic changes at the lower lobes. There is  no evidence for pneumonia and there are no pleural or pericardial  effusions.     3. There are shotty nodes at the mediastinum and nuris and there are a  few borderline enlarged axillary nodes. One of the largest axillary  nodes is on the right measuring 1.6 x 1.1 cm. There are shotty and  mildly enlarged nodes at the retroperitoneum and pelvis. One of the  largest nodes is adjacent to the right external iliac vessels measuring  2.9 x 1.3 cm. A node adjacent to the IV CT at the mid abdomen measures  1.7 x 1.4 cm. There are no pathologically enlarged nodes at the ilsa  hepatis. The spleen measures 15 cm in diameter. FOLLOW-UP FOR THE MILD  LYMPHADENOPATHY AND SPLENOMEGALY IS RECOMMENDED.     4. At the pancreas, there is a hyperdense 6 mm nodular density with  pancreatic ductal dilatation adjacently, series 8 images 154-157. The  hyperdensity is not seen on the precontrast series and therefore likely  represents a hyperenhancing nodule such as can be seen with a  neuroendocrine tumor. ENDOSCOPIC ULTRASOUND AND FNA IS RECOMMENDED.     5. The liver, adrenals, and kidneys appear unremarkable. There is a  subcentimeter right renal cyst. There is no acute bowel abnormality.  Appendix appears within normal limits. The prostate is very  heterogeneous and measures 5.7 cm in diameter. The heterogeneity may be  related to a TURP defect, but PSA and urology follow-up is recommended.       CT Angiogram Abdomen Pelvis [[id number]] Collected: 05/10/24 1941     Updated: 05/10/24 1941    Narrative:      CT ANGIOGRAM OF THE CHEST, abdomen, and pelvis. MULTIPLE CORONAL,  SAGITTAL, AND 3-D  RECONSTRUCTIONS.     HISTORY: 73-year-old male with epigastric pain and back pain. Nausea and  diaphoresis.     TECHNIQUE: Radiation dose reduction techniques were utilized, including  automated exposure control and exposure modulation based on body size.  Cardiac gated CT angiogram of the chest, abdomen, and pelvis was  performed following the administration of IV contrast. Multiple coronal,  sagittal, and 3-D reconstruction images were obtained. There are no  priors for comparison.     FINDINGS:  1. There is no aneurysmal dilatation of the thoracic or abdominal aorta  and there is no evidence for a dissection. Aortic root measures 3.2 cm,  ascending thoracic aorta 3.0 cm, descending thoracic aorta 2.5 cm, mid  abdominal aorta 2.0 cm.     The celiac artery, SMA, LEROY, renal arteries, common iliac, internal and  external iliac, common femoral arteries are patent. No incidental  pulmonary thromboemboli are seen.     2. There are dependent atelectatic changes at the lower lobes. There is  no evidence for pneumonia and there are no pleural or pericardial  effusions.     3. There are shotty nodes at the mediastinum and nuris and there are a  few borderline enlarged axillary nodes. One of the largest axillary  nodes is on the right measuring 1.6 x 1.1 cm. There are shotty and  mildly enlarged nodes at the retroperitoneum and pelvis. One of the  largest nodes is adjacent to the right external iliac vessels measuring  2.9 x 1.3 cm. A node adjacent to the IV CT at the mid abdomen measures  1.7 x 1.4 cm. There are no pathologically enlarged nodes at the ilsa  hepatis. The spleen measures 15 cm in diameter. FOLLOW-UP FOR THE MILD  LYMPHADENOPATHY AND SPLENOMEGALY IS RECOMMENDED.     4. At the pancreas, there is a hyperdense 6 mm nodular density with  pancreatic ductal dilatation adjacently, series 8 images 154-157. The  hyperdensity is not seen on the precontrast series and therefore likely  represents a hyperenhancing nodule  such as can be seen with a  neuroendocrine tumor. ENDOSCOPIC ULTRASOUND AND FNA IS RECOMMENDED.     5. The liver, adrenals, and kidneys appear unremarkable. There is a  subcentimeter right renal cyst. There is no acute bowel abnormality.  Appendix appears within normal limits. The prostate is very  heterogeneous and measures 5.7 cm in diameter. The heterogeneity may be  related to a TURP defect, but PSA and urology follow-up is recommended.       XR Chest 1 View [[id number]] Collected: 05/10/24 1830     Updated: 05/10/24 1834    Narrative:      XR CHEST 1 VW-     HISTORY: 73-year-old male with chest pain.     FINDINGS: Limited apical lordotic projection and the left costophrenic  angle is not fully included. There is no convincing evidence for CHF or  pneumonia, but follow-up with upright 2 views of the chest is  recommended.     This report was finalized on 5/10/2024 6:31 PM by Dr. Emily Alex M.D on  Workstation: [id number]               I have reviewed the medications.  ---------------------------------------------------------------------------------------------  Assessment & Plan   Assessment/Problem List    Epigastric pain    Plan:    Epigastric pain  -High-sensitivity troponin 11, 11, 12  -D-dimer negative  -EKG  sinus rhythm, chronic right bundle branch block, no acute ischemia  -CTA chest, abdomen and pelvis notes mild lymphadenopathy and splenomegaly as well as a nodular density with pancreatic duct dilatation  -Lipase 87  -Echocardiogram showed normal LV systolic function with an EF of 61 to 65%.  Trace mitral valve regurgitation present.  Trivial pericardial effusion noted.  No further workup indicated from their standpoint.  -GI consulted  -MRCP with and without contrast recommended.  We cannot perform this procedure until 1930 as he ate at 1330.  Disposition pending this result.     Hypertension  -Continue lisinopril hydrochlorothiazide  -Monitor vital signs every 4 hours     CKD  -Creatinine 1.54 on  admission, 1.29 today  -[facility] everywhere records show previous creatinine 1.4-1.5 since 2021    DVT prophylaxis:  Mechanical DVT prophylaxis orders are present.     Disposition: Likely home in 1 day    Follow-up after Discharge: Dependent on hospital course    This note will serve as a progress note    Leyda Monae PA-C 05/11/24 17:43 EDT    I have worn appropriate PPE during this patient encounter, sanitized my hands both with entering and exiting patient's room.      52 minutes has been spent by [facility] providers in the care of this patient while under observation status

## 2024-05-12 VITALS
SYSTOLIC BLOOD PRESSURE: 130 MMHG | DIASTOLIC BLOOD PRESSURE: 69 MMHG | TEMPERATURE: 98.6 F | OXYGEN SATURATION: 97 % | HEIGHT: 71 IN | WEIGHT: 230 LBS | HEART RATE: 98 BPM | BODY MASS INDEX: 32.2 KG/M2 | RESPIRATION RATE: 16 BRPM

## 2024-05-12 PROCEDURE — 25810000003 SODIUM CHLORIDE 0.9 % SOLUTION: Performed by: STUDENT IN AN ORGANIZED HEALTH CARE EDUCATION/TRAINING PROGRAM

## 2024-05-12 PROCEDURE — 99214 OFFICE O/P EST MOD 30 MIN: CPT | Performed by: INTERNAL MEDICINE

## 2024-05-12 PROCEDURE — 0 GADOBENATE DIMEGLUMINE 529 MG/ML SOLUTION: Performed by: STUDENT IN AN ORGANIZED HEALTH CARE EDUCATION/TRAINING PROGRAM

## 2024-05-12 PROCEDURE — 96361 HYDRATE IV INFUSION ADD-ON: CPT

## 2024-05-12 PROCEDURE — A9577 INJ MULTIHANCE: HCPCS | Performed by: STUDENT IN AN ORGANIZED HEALTH CARE EDUCATION/TRAINING PROGRAM

## 2024-05-12 PROCEDURE — G0378 HOSPITAL OBSERVATION PER HR: HCPCS

## 2024-05-12 RX ADMIN — SODIUM CHLORIDE 125 ML/HR: 9 INJECTION, SOLUTION INTRAVENOUS at 03:57

## 2024-05-12 RX ADMIN — HYDROCHLOROTHIAZIDE 25 MG: 25 TABLET ORAL at 08:24

## 2024-05-12 RX ADMIN — GADOBENATE DIMEGLUMINE 20 ML: 529 INJECTION, SOLUTION INTRAVENOUS at 00:00

## 2024-05-12 RX ADMIN — LISINOPRIL 20 MG: 20 TABLET ORAL at 08:24

## 2024-05-12 NOTE — PLAN OF CARE
Goal Outcome Evaluation:               Patient and family have all belongings, discharge and discs of imaging. They voice understanding of discharge and follow up.

## 2024-05-12 NOTE — PROGRESS NOTES
HEBERT SANTAMARIA Attestation Note    I supervised care provided by the midlevel provider.    The BERRY and I have discussed this patient's history, physical exam, and treatment plan. I have reviewed the documentation and personally had a face to face interaction with the patient  I affirm the documentation and agree with the treatment and plan. I provided a substantive portion of the care of this patient.  I personally performed the physical exam, in its entirety.  My personal findings are documented in below:    History:  73-year-old male admitted for epigastric pain feels improved this morning.    Physical Exam:  General: No acute distress.  HENT: NCAT, PERRL, Nares patent.  Eyes: no scleral icterus.  Neck: trachea midline, no ROM limitations.  CV: Pink warm and well-perfused throughout  Respiratory: No distress or increased work of breathing  Abdomen: soft, no focal tenderness or rigidity.  Benign  Musculoskeletal: no deformity.  Neuro: alert, moves all extremities, follows commands.  Skin: warm, dry.    Assessment and Plan:  MRI abdomen w wo contrast mrcp    Result Date: 5/12/2024  Narrative: MRI ABDOMEN W WO CONTRAST MRCP-  INDICATION: Abnormal LFTs, epigastric pain  COMPARISON: CTA abdomen pelvis May 10, 2024  TECHNIQUE: MRI of the abdomen with T1, T2 and MRCP sequences with postcontrast dynamic phase imaging.  FINDINGS:  Lung bases: Normal.  ABDOMEN: Liver loses signal intensity on out of phase imaging indicating hepatic steatosis. Cholecystectomy. No biliary ductal dilatation. Mild splenomegaly with the spleen measuring 14.2 cm in anterior posterior dimension. Multiple tiny T2 hyperintense cysts in the pancreatic tail, series 6, axial image 18, measuring 2 to 3 mm. Larger T2 hyperintense cyst seen in the pancreatic neck, series 6, axial image 20, measuring 1.6 cm appears to communicate with the main pancreatic duct, without a thickened wall, mural nodule or enhancing solid component evident, without main pancreatic  ductal dilatation, with cluster of cyst appearance, suspect a sidebranch intraductal papillary mucinous neoplasm.  No adrenal nodules. Mild renal cortical thinning. Small T2 hyperintense cyst in the right mid kidney. No solid-appearing renal mass or hydronephrosis.  Bowel: No obstruction. Incidental small bowel malrotation.  Abdominal wall: Mild rectus diastases.  Retroperitoneum: Mild abdominal retroperitoneal lymphadenopathy. For example, left periaortic lymph node, series 17, axial image 49, measures 1.3 cm. For example, right pericaval lymph node, series 17, axial image 50, measures 1.3 cm..  Vasculature: Patent. No abdominal aortic aneurysm.  Osseous structures: No bone lesion.      Impression:  1. Cystic mass seen in the pancreatic neck with cluster of cyst appearance, communicates with the main pancreatic duct. Suspect sidebranch intraductal papillary mucinous neoplasm measuring 1.6 cm. No worrisome features or high-risk stigmata evident. 1 year follow-up MRI pancreas/MRCP can reevaluate. Alternatively, endoscopic ultrasound/FNA as clinically indicated. 2. Hepatic steatosis. 3. Mild splenomegaly. 4. Mild nonspecific abdominal retroperitoneal adenopathy..  This report was finalized on 5/12/2024 8:08 AM by Dr. Nolberto Ruiz M.D on Workstation: [id number]      Adult Transthoracic Echo Complete W/ Cont if Necessary Per Protocol    Result Date: 5/11/2024  Narrative:   Left ventricular systolic function is normal. Left ventricular ejection fraction appears to be 61 - 65%.   Left ventricular diastolic function was normal.   Normal right ventricular cavity size and systolic function noted.   The left atrial cavity is mildly dilated.   The mitral valve leaflets mildly thickened and mildly calcified. There is borderline bileaflet mitral valve prolapse   Trace mitral valve regurgitation is present.   Insufficient TR velocity profile to estimate the right ventricular systolic pressure.   There is a trivial  pericardial effusion.     CT Angiogram Chest, CT Angiogram Abdomen Pelvis    Result Date: 5/10/2024  Narrative: CT ANGIOGRAM OF THE CHEST, abdomen, and pelvis. MULTIPLE CORONAL, SAGITTAL, AND 3-D RECONSTRUCTIONS.  HISTORY: 73-year-old male with epigastric pain and back pain. Nausea and diaphoresis.  TECHNIQUE: Radiation dose reduction techniques were utilized, including automated exposure control and exposure modulation based on body size. Cardiac gated CT angiogram of the chest, abdomen, and pelvis was performed following the administration of IV contrast. Multiple coronal, sagittal, and 3-D reconstruction images were obtained. There are no priors for comparison.  FINDINGS: 1. There is no aneurysmal dilatation of the thoracic or abdominal aorta and there is no evidence for a dissection. Aortic root measures 3.2 cm, ascending thoracic aorta 3.0 cm, descending thoracic aorta 2.5 cm, mid abdominal aorta 2.0 cm.  The celiac artery, SMA, LEROY, renal arteries, common iliac, internal and external iliac, common femoral arteries are patent. No incidental pulmonary thromboemboli are seen.  2. There are dependent atelectatic changes at the lower lobes. There is no evidence for pneumonia and there are no pleural or pericardial effusions.  3. There are shotty nodes at the mediastinum and nuris and there are a few borderline enlarged axillary nodes. One of the largest axillary nodes is on the right measuring 1.6 x 1.1 cm. There are shotty and mildly enlarged nodes at the retroperitoneum and pelvis. One of the largest nodes is adjacent to the right external iliac vessels measuring 2.9 x 1.3 cm. A node adjacent to the IV CT at the mid abdomen measures 1.7 x 1.4 cm. There are no pathologically enlarged nodes at the ilsa hepatis. The spleen measures 15 cm in diameter. FOLLOW-UP FOR THE MILD LYMPHADENOPATHY AND SPLENOMEGALY IS RECOMMENDED.  4. At the pancreas, there is a hyperdense 6 mm nodular density with pancreatic ductal  dilatation adjacently, series 8 images 154-157. The hyperdensity is not seen on the precontrast series and therefore likely represents a hyperenhancing nodule such as can be seen with a neuroendocrine tumor. ENDOSCOPIC ULTRASOUND AND FNA IS RECOMMENDED.  5. The liver, adrenals, and kidneys appear unremarkable. There is a subcentimeter right renal cyst. There is no acute bowel abnormality. Appendix appears within normal limits. The prostate is very heterogeneous and measures 5.7 cm in diameter. The heterogeneity may be related to a TURP defect, but PSA and urology follow-up is recommended.      XR Chest 1 View    Result Date: 5/10/2024  Narrative: XR CHEST 1 VW-  HISTORY: 73-year-old male with chest pain.  FINDINGS: Limited apical lordotic projection and the left costophrenic angle is not fully included. There is no convincing evidence for CHF or pneumonia, but follow-up with upright 2 views of the chest is recommended.  This report was finalized on 5/10/2024 6:31 PM by Dr. Emily Alex M.D on Workstation: [id number]

## 2024-05-12 NOTE — PLAN OF CARE
Goal Outcome Evaluation:  Plan of Care Reviewed With: patient           Outcome Evaluation: oriented, no c/o pain, ambulates standby- assist x1, and IVF @125. MRI abd performed.

## 2024-05-12 NOTE — PROGRESS NOTES
[facility]  Inpatient Progress Note    Reason for Follow Up: Epigastric pain, abnormal CT    Subjective     Interval History:   MRCP shows hepatic steatosis with no ductal dilation, mild splenomegaly, and multiple cysts in the pancreatic tail as well as in the pancreatic neck that appears to communicate with the main pancreatic duct.  There was some suspicion of a sidebranch intraductal papillary mucinous neoplasm.  Recommendation was to repeat MR study in 1 year or consider ultrasonic guided fine-needle aspirate.  Discussed same with hospital service as well as patient and family at bedside.  Advised he consider follow-up with his gastroenterologist of record upon return to [place].  He recalls having a similar MR study done a year ago with essentially the same findings.    Current Facility-Administered Medications:     acetaminophen (TYLENOL) tablet 650 mg, 650 mg, Oral, Q4H PRN, César, Payton R, PA-C    aspirin tablet 325 mg, 325 mg, Oral, Once, César, Payton R, PA-C    lisinopril (PRINIVIL,ZESTRIL) tablet 20 mg, 20 mg, Oral, Q24H, 20 mg at 05/12/24 0824 **AND** hydroCHLOROthiazide tablet 25 mg, 25 mg, Oral, Q24H, César, Payton R, PA-C, 25 mg at 05/12/24 0824    nitroglycerin (NITROSTAT) SL tablet 0.4 mg, 0.4 mg, Sublingual, Q5 Min PRN, César, Payton R, PA-C    ondansetron ODT (ZOFRAN-ODT) disintegrating tablet 4 mg, 4 mg, Oral, Q6H PRN **OR** ondansetron (ZOFRAN) injection 4 mg, 4 mg, Intravenous, Q6H PRN, César, Payton R, PA-C, 4 mg at 05/11/24 1629    sodium chloride 0.9 % flush 10 mL, 10 mL, Intravenous, PRN, César, Payton R, PA-C, 10 mL at 05/10/24 1819    sodium chloride 0.9 % flush 10 mL, 10 mL, Intravenous, Q12H, César, Payton R, PA-C, 10 mL at 05/11/24 2259    sodium chloride 0.9 % flush 10 mL, 10 mL, Intravenous, PRN, César, Payton R, PA-C    sodium chloride 0.9 % infusion 40 mL, 40 mL, Intravenous, PRN, Payton Lindsey, KATHERYN    sodium  chloride 0.9 % infusion, 125 mL/hr, Intravenous, Continuous, Payton Lindsey PA-C, Last Rate: 125 mL/hr at 05/12/24 0357, 125 mL/hr at 05/12/24 0357  Review of Systems:    All systems were reviewed and negative except for:  Gastrointestinal: positive for  See HPI    Objective     Vital Signs  Temp:  [98.1 °F (36.7 °C)-98.6 °F (37 °C)] 98.6 °F (37 °C)  Heart Rate:  [70-98] 98  Resp:  [16-18] 16  BP: (119-150)/(69-75) 130/69  Body mass index is 32.08 kg/m².  No intake or output data in the 24 hours ending 05/12/24 0829  No intake/output data recorded.     Physical Exam:   General: patient awake, alert and cooperative   Eyes: Normal lids and lashes, no scleral icterus   Neck: supple, normal ROM   Skin: warm and dry, not jaundiced   Cardiovascular: regular rhythm and rate, no murmurs auscultated   Pulm: clear to auscultation bilaterally, regular and unlabored   Abdomen: soft, nontender, nondistended; normal bowel sounds   Extremities: no rash or edema   Psychiatric: Normal mood and behavior; memory intact     Results Review:     I reviewed the patient's new clinical results.    Results from last 7 days   Lab Units 05/11/24  0417 05/10/24  1805   WBC 10*3/mm3 6.19 8.36   HEMOGLOBIN g/dL 15.2 16.0   HEMATOCRIT % 44.5 48.6   PLATELETS 10*3/mm3 142 196     Results from last 7 days   Lab Units 05/11/24  0417 05/10/24  1805   SODIUM mmol/L 138 141   POTASSIUM mmol/L 3.6 3.4*   CHLORIDE mmol/L 107 104   CO2 mmol/L 19.9* 22.0   BUN mg/dL 23 24*   CREATININE mg/dL 1.29* 1.54*   CALCIUM mg/dL 7.8* 8.6   BILIRUBIN mg/dL 1.1 1.0   ALK PHOS U/L 109 83   ALT (SGPT) U/L 132* 22   AST (SGOT) U/L 151* 27   GLUCOSE mg/dL 102* 102*     Results from last 7 days   Lab Units 05/10/24  1805   INR  1.10     Lab Results   Lab Value Date/Time    LIPASE 42 05/11/2024 0417    LIPASE 87 (H) 05/10/2024 1805       Radiology:  MRI abdomen w wo contrast mrcp   Final Result       1. Cystic mass seen in the pancreatic neck with cluster of cyst    appearance, communicates with the main pancreatic duct. Suspect   sidebranch intraductal papillary mucinous neoplasm measuring 1.6 cm. No   worrisome features or high-risk stigmata evident. 1 year follow-up MRI   pancreas/MRCP can reevaluate. Alternatively, endoscopic ultrasound/FNA   as clinically indicated.   2. Hepatic steatosis.   3. Mild splenomegaly.   4. Mild nonspecific abdominal retroperitoneal adenopathy..       This report was finalized on 5/12/2024 8:08 AM by Dr. Nolberto Ruiz M.D on Workstation: [id number]          CT Angiogram Chest         CT Angiogram Abdomen Pelvis         XR Chest 1 View   Final Result          Assessment & Plan     Active Hospital Problems    Diagnosis     **Epigastric pain        Assessment:  Epigastric pain: Subsided: Etiology not well-defined although there could have been some component related to pancreatic system.  Elevated LFTs: Would monitor this in the outpatient setting  Abnormal CT findings: Appear to be of a similar nature to studies done in the past, however, would defer to his gastroenterologist of record as far as further workup.      Plan:  Patient appears to be stable from a gastroenterologic standpoint for discharge  Follow-up as outlined above  I discussed the patients findings and my recommendations with patient, family, and primary care team.    Nolberto Castaneda MD

## 2024-05-12 NOTE — DISCHARGE SUMMARY
ED OBSERVATION PROGRESS/DISCHARGE SUMMARY    Date of Admission: 5/10/2024   LOS: 0 days   PCP: Provider, No Known    Final Diagnosis: Epigastric pain, abnormal CT findings    Hospital Outcome:     Chilango Ramirez is a 73 y.o. male who was admitted the observation unit for further evaluation of epigastric pain.  In the ER, high-sensitivity troponins and D-dimer were negative.  EKG shows sinus rhythm with right bundle branch block that is unchanged from previous EKG.  CTA of the chest showed no evidence of aneurysm, dissection, or PE.  CTA of the abdomen and pelvis shows some mild lymphadenopathy and splenomegaly as well as a nodular density with pancreatic duct dilatation.  Urinalysis clean.       5/11  He was evaluated by cardiology.  He had an echocardiogram which showed normal LV systolic function with an EF of 61 to 65%.  Trace mitral valve regurgitation present.  Trivial pericardial effusion noted.  No further workup indicated from their standpoint.  Gastroenterology also evaluated the patient.  They recommended an MRCP.     5/12  MRCP again shows cystic mass in the pancreatic neck with a cluster of cyst appearance, communicates with the main pancreatic duct.  Suspect sidebranch intraductal papillary mucinous neoplasm measuring 1.6 cm.  No worrisome features or high risk stigmata evident.  Hepatic steatosis.  Mild splenomegaly.  No biliary ductal dilatation noted.  He has been cleared to discharge from GI standpoint.  He will follow-up with his primary care doctor and primary gastroenterologist in [place].  He will be discharged home today.    ROS:  General: no fevers, chills  Respiratory: no cough, dyspnea  Cardiovascular: no chest pain, palpitations  Abdomen: No abdominal pain, nausea, vomiting, or diarrhea  Neurologic: No focal weakness    Objective   Physical Exam:  I have reviewed the vital signs.  Temp:  [98.1 °F (36.7 °C)-98.6 °F (37 °C)] 98.6 °F (37 °C)  Heart Rate:  [70-98] 98  Resp:  [16-18] 16  BP:  (119-150)/(69-75) 130/69  General Appearance:    Alert, cooperative, no distress  Head:    Normocephalic, atraumatic  Eyes:    Sclerae anicteric  Neck:   Supple, no mass  Lungs: Clear to auscultation bilaterally, respirations unlabored  Heart: Regular rate and rhythm, S1 and S2 normal, no murmur, rub or gallop  Abdomen:  Soft, non-tender, bowel sounds active, nondistended  Extremities: No clubbing, cyanosis, or edema to lower extremities  Pulses:  2+ and symmetric in distal lower extremities  Skin: No rashes   Neurologic: Oriented x3, Normal strength to extremities    Results Review:    I have reviewed the labs, radiology results and diagnostic studies.    Results from last 7 days   Lab Units 05/11/24  0417   WBC 10*3/mm3 6.19   HEMOGLOBIN g/dL 15.2   HEMATOCRIT % 44.5   PLATELETS 10*3/mm3 142     Results from last 7 days   Lab Units 05/11/24  0417 05/10/24  1805   SODIUM mmol/L 138 141   POTASSIUM mmol/L 3.6 3.4*   CHLORIDE mmol/L 107 104   CO2 mmol/L 19.9* 22.0   BUN mg/dL 23 24*   CREATININE mg/dL 1.29* 1.54*   CALCIUM mg/dL 7.8* 8.6   BILIRUBIN mg/dL 1.1 1.0   ALK PHOS U/L 109 83   ALT (SGPT) U/L 132* 22   AST (SGOT) U/L 151* 27   GLUCOSE mg/dL 102* 102*     Imaging Results (Last 24 Hours)       Procedure Component Value Units Date/Time    MRI abdomen w wo contrast mrcp [[id number]] Collected: 05/12/24 0753     Updated: 05/12/24 0811    Narrative:      MRI ABDOMEN W WO CONTRAST MRCP-     INDICATION: Abnormal LFTs, epigastric pain     COMPARISON: CTA abdomen pelvis May 10, 2024     TECHNIQUE: MRI of the abdomen with T1, T2 and MRCP sequences with  postcontrast dynamic phase imaging.     FINDINGS:      Lung bases: Normal.     ABDOMEN: Liver loses signal intensity on out of phase imaging indicating  hepatic steatosis. Cholecystectomy. No biliary ductal dilatation. Mild  splenomegaly with the spleen measuring 14.2 cm in anterior posterior  dimension. Multiple tiny T2 hyperintense cysts in the pancreatic tail,  series  6, axial image 18, measuring 2 to 3 mm. Larger T2 hyperintense  cyst seen in the pancreatic neck, series 6, axial image 20, measuring  1.6 cm appears to communicate with the main pancreatic duct, without a  thickened wall, mural nodule or enhancing solid component evident,  without main pancreatic ductal dilatation, with cluster of cyst  appearance, suspect a sidebranch intraductal papillary mucinous  neoplasm.     No adrenal nodules. Mild renal cortical thinning. Small T2 hyperintense  cyst in the right mid kidney. No solid-appearing renal mass or  hydronephrosis.     Bowel: No obstruction. Incidental small bowel malrotation.     Abdominal wall: Mild rectus diastases.     Retroperitoneum: Mild abdominal retroperitoneal lymphadenopathy. For  example, left periaortic lymph node, series 17, axial image 49, measures  1.3 cm. For example, right pericaval lymph node, series 17, axial image  50, measures 1.3 cm..     Vasculature: Patent. No abdominal aortic aneurysm.     Osseous structures: No bone lesion.       Impression:         1. Cystic mass seen in the pancreatic neck with cluster of cyst  appearance, communicates with the main pancreatic duct. Suspect  sidebranch intraductal papillary mucinous neoplasm measuring 1.6 cm. No  worrisome features or high-risk stigmata evident. 1 year follow-up MRI  pancreas/MRCP can reevaluate. Alternatively, endoscopic ultrasound/FNA  as clinically indicated.  2. Hepatic steatosis.  3. Mild splenomegaly.  4. Mild nonspecific abdominal retroperitoneal adenopathy..     This report was finalized on 5/12/2024 8:08 AM by Dr. Nolberto Ruiz M.D on Workstation: [id number]               I have reviewed the medications.  ---------------------------------------------------------------------------------------------  Assessment & Plan   Assessment/Problem List    Epigastric pain      Plan:    Epigastric pain  -High-sensitivity troponin 11, 11, 12  -D-dimer negative  -EKG  sinus rhythm,  chronic right bundle branch block, no acute ischemia  -CTA chest, abdomen and pelvis notes mild lymphadenopathy and splenomegaly as well as a nodular density with pancreatic duct dilatation  -Lipase 87  -Echocardiogram showed normal LV systolic function with an EF of 61 to 65%.  Trace mitral valve regurgitation present.  Trivial pericardial effusion noted.  No further workup indicated from cardiac standpoint.  -MRCP with and without contrast  Again shows cystic mass in pancreatic neck with cluster of cyst appearance, communicates with the main pancreatic duct.  Suspect sidebranch intraductal papillary mucinous neoplasm measuring 1.6 cm.  No worrisome features for high risk stigmata event.  1 year follow-up MRI pancreas/MRCP can reevaluate.  Alternatively endoscopic ultrasound/FNA as clinically indicated.  -The patient will follow-up with his primary care provider in [place].  He is already established with a gastroenterologist there.  -Both gastroenterology and cardiology has evaluated the patient and determined he is safe to discharge home with plans to follow-up with his primary doctors in [place].    Elevated LFTs  Hepatic steatosis  Mild splenomegaly  -Follow-up with your primary care doctor/primary gastroenterologist    Hypertension  -Continue lisinopril-hydrochlorothiazide     CKD  -Creatinine 1.54 on admission, improved from 1.29 on 511  -Care everywhere records show previous creatinine 1.4-1.5 since 2021     BPH  -The prostate noted to be heterogenous, 5.7 cm in diameter on CT.  May be related to TURP defect, PSA and urology follow-up recommended.  -He is scheduled to see his urologist next week, he should keep this appointment.  -Continue Flomax    DVT prophylaxis:  Mechanical DVT prophylaxis orders are present.    Disposition: Home    Follow-up after Discharge: Gastroenterology, primary care    This note will serve as a discharge summary    Rocio Orta, APRN 05/12/24 09:05 EDT    I have worn appropriate PPE  during this patient encounter, sanitized my hands both with entering and exiting patient's room.    32 minutes has been spent by [facility] providers in the care of this patient while under observation status.

## 2024-05-12 NOTE — DISCHARGE INSTRUCTIONS
Follow-up with your primary care provider in 1 to 2 weeks  Follow-up with your primary gastroenterologist regarding CT findings.

## 2024-07-17 ENCOUNTER — OFFICE (OUTPATIENT)
Dept: URBAN - METROPOLITAN AREA CLINIC 16 | Facility: CLINIC | Age: 74
End: 2024-07-17
Payer: COMMERCIAL

## 2024-07-17 VITALS
HEIGHT: 73 IN | SYSTOLIC BLOOD PRESSURE: 118 MMHG | WEIGHT: 237.6 LBS | OXYGEN SATURATION: 97 % | HEART RATE: 77 BPM | DIASTOLIC BLOOD PRESSURE: 74 MMHG

## 2024-07-17 DIAGNOSIS — R07.9 CHEST PAIN, UNSPECIFIED: ICD-10-CM

## 2024-07-17 DIAGNOSIS — D49.0 NEOPLASM OF UNSPECIFIED BEHAVIOR OF DIGESTIVE SYSTEM: ICD-10-CM

## 2024-07-17 PROCEDURE — 99214 OFFICE O/P EST MOD 30 MIN: CPT | Performed by: INTERNAL MEDICINE
